# Patient Record
Sex: FEMALE | Race: BLACK OR AFRICAN AMERICAN | NOT HISPANIC OR LATINO | Employment: OTHER | URBAN - NONMETROPOLITAN AREA
[De-identification: names, ages, dates, MRNs, and addresses within clinical notes are randomized per-mention and may not be internally consistent; named-entity substitution may affect disease eponyms.]

---

## 2022-05-13 ENCOUNTER — OFFICE VISIT (OUTPATIENT)
Dept: FAMILY MEDICINE CLINIC | Facility: CLINIC | Age: 77
End: 2022-05-13

## 2022-05-13 VITALS
WEIGHT: 150 LBS | SYSTOLIC BLOOD PRESSURE: 114 MMHG | BODY MASS INDEX: 23.54 KG/M2 | OXYGEN SATURATION: 97 % | HEIGHT: 67 IN | DIASTOLIC BLOOD PRESSURE: 76 MMHG | HEART RATE: 88 BPM

## 2022-05-13 DIAGNOSIS — R09.89 POOR PERIPHERAL CIRCULATION: Primary | ICD-10-CM

## 2022-05-13 DIAGNOSIS — M17.12 PRIMARY OSTEOARTHRITIS OF LEFT KNEE: ICD-10-CM

## 2022-05-13 PROBLEM — E11.22 TYPE 2 DIABETES MELLITUS WITH DIABETIC CHRONIC KIDNEY DISEASE: Status: ACTIVE | Noted: 2022-04-12

## 2022-05-13 PROBLEM — I50.9 CONGESTIVE HEART FAILURE: Status: ACTIVE | Noted: 2022-04-12

## 2022-05-13 PROBLEM — Z85.3 PERSONAL HISTORY OF BREAST CANCER: Status: ACTIVE | Noted: 2022-04-12

## 2022-05-13 PROBLEM — N18.32 STAGE 3B CHRONIC KIDNEY DISEASE: Status: ACTIVE | Noted: 2022-04-12

## 2022-05-13 PROBLEM — F44.5 DISSOCIATIVE CONVULSIONS: Status: ACTIVE | Noted: 2020-01-16

## 2022-05-13 PROBLEM — I69.30 SEQUELA OF CEREBROVASCULAR ACCIDENT: Status: ACTIVE | Noted: 2020-01-16

## 2022-05-13 PROBLEM — N18.9 ANEMIA IN CHRONIC KIDNEY DISEASE: Status: ACTIVE | Noted: 2022-04-12

## 2022-05-13 PROBLEM — N25.81 SECONDARY HYPERPARATHYROIDISM OF RENAL ORIGIN: Status: ACTIVE | Noted: 2022-04-12

## 2022-05-13 PROBLEM — R60.9 EDEMA: Status: ACTIVE | Noted: 2022-04-12

## 2022-05-13 PROBLEM — R41.3 MEMORY IMPAIRMENT: Status: ACTIVE | Noted: 2019-07-11

## 2022-05-13 PROBLEM — G40.909 SEIZURE DISORDER: Status: ACTIVE | Noted: 2022-04-12

## 2022-05-13 PROBLEM — F32.A DEPRESSIVE DISORDER: Status: ACTIVE | Noted: 2022-04-12

## 2022-05-13 PROBLEM — D63.1 ANEMIA IN CHRONIC KIDNEY DISEASE: Status: ACTIVE | Noted: 2022-04-12

## 2022-05-13 PROBLEM — I12.9 UNSPECIFIED HYPERTENSIVE KIDNEY DISEASE WITH CHRONIC KIDNEY DISEASE STAGE I THROUGH STAGE IV, OR UNSPECIFIED: Status: ACTIVE | Noted: 2022-04-12

## 2022-05-13 PROBLEM — N25.89 RENAL TUBULAR ACIDOSIS: Status: ACTIVE | Noted: 2022-04-12

## 2022-05-13 PROBLEM — Z86.73 PERSONAL HISTORY OF TRANSIENT ISCHEMIC ATTACK (TIA), AND CEREBRAL INFARCTION WITHOUT RESIDUAL DEFICITS: Status: ACTIVE | Noted: 2022-04-12

## 2022-05-13 PROCEDURE — 99203 OFFICE O/P NEW LOW 30 MIN: CPT | Performed by: PHYSICIAN ASSISTANT

## 2022-05-13 RX ORDER — ROSUVASTATIN CALCIUM 20 MG/1
1 TABLET, COATED ORAL DAILY
COMMUNITY
Start: 2021-12-10 | End: 2022-06-17 | Stop reason: SDUPTHER

## 2022-05-13 RX ORDER — LETROZOLE 2.5 MG/1
1 TABLET, FILM COATED ORAL DAILY
COMMUNITY
End: 2022-07-28 | Stop reason: SDUPTHER

## 2022-05-13 RX ORDER — MULTIPLE VITAMINS W/ MINERALS TAB 9MG-400MCG
1 TAB ORAL DAILY
COMMUNITY

## 2022-05-13 RX ORDER — PHENYTOIN SODIUM 100 MG/1
100 CAPSULE, EXTENDED RELEASE ORAL
COMMUNITY
Start: 2021-12-10 | End: 2022-06-17 | Stop reason: SDUPTHER

## 2022-05-13 RX ORDER — OLMESARTAN MEDOXOMIL 40 MG/1
1 TABLET ORAL DAILY
COMMUNITY
Start: 2021-12-10 | End: 2022-06-17 | Stop reason: SDUPTHER

## 2022-05-13 RX ORDER — HYDRALAZINE HYDROCHLORIDE 50 MG/1
1 TABLET, FILM COATED ORAL 3 TIMES DAILY
COMMUNITY
Start: 2021-12-10 | End: 2022-06-17 | Stop reason: ALTCHOICE

## 2022-05-13 RX ORDER — SERTRALINE HYDROCHLORIDE 25 MG/1
TABLET, FILM COATED ORAL
COMMUNITY
Start: 2022-02-27 | End: 2022-05-13

## 2022-05-13 RX ORDER — LIDOCAINE 50 MG/G
1 PATCH TOPICAL EVERY 24 HOURS
Qty: 30 PATCH | Refills: 3 | Status: SHIPPED | OUTPATIENT
Start: 2022-05-13 | End: 2022-08-15

## 2022-05-13 RX ORDER — PAROXETINE 10 MG/1
1 TABLET, FILM COATED ORAL DAILY
COMMUNITY
End: 2022-06-17 | Stop reason: ALTCHOICE

## 2022-05-13 RX ORDER — FUROSEMIDE 20 MG/1
TABLET ORAL
COMMUNITY
Start: 2022-02-27 | End: 2022-06-17 | Stop reason: SDUPTHER

## 2022-05-13 RX ORDER — TAMSULOSIN HYDROCHLORIDE 0.4 MG/1
CAPSULE ORAL
COMMUNITY
Start: 2022-02-27 | End: 2022-06-17 | Stop reason: SDUPTHER

## 2022-05-13 NOTE — ASSESSMENT & PLAN NOTE
I explained that yes she may have some poor circulation but since she is not having any issues with it, and given her other health issues,  I do not recommend any to be done about it at this time. Continue to monitor for evidence of skin break down or other complications.

## 2022-05-13 NOTE — ASSESSMENT & PLAN NOTE
She is complaining of knee pain today, she is not a candidate for NSAIDS, she takes tylenol as needed but would like to try a pain patch.  I sent RX for lididerm hopefully it will give her some relieff

## 2022-05-13 NOTE — PROGRESS NOTES
"Chief Complaint  Circulatory Problem (The [facility] nurse came and told her she had circulation problems. )    Subjective          Sirena Chong presents to [facility]  History of Present Illness patient states she is here today because the [facility] nurse told her she had \"poor circulation\" in her legs and they told her that she needed to get something done for this.  She is not having any symptoms she has chronic knee pain in her calf.  She has no numbness tingling or neuropathic pain in her feet and legs.  She does not have open sores or skin changes to her lower extremities.    Objective     Vital Signs:   /76 (BP Location: Right arm, Patient Position: Sitting, Cuff Size: Adult)   Pulse 88   Ht 170.2 cm (67\")   Wt 68 kg (150 lb)   SpO2 97%   BMI 23.49 kg/m²     Body mass index is 23.49 kg/m².    Review of Systems   Constitutional: Negative.    Respiratory: Negative.    Cardiovascular: Negative.    Musculoskeletal: Positive for arthralgias.   Psychiatric/Behavioral: Negative.        Social History: reports that she quit smoking about 6 years ago. Her smoking use included cigarettes. She has a 25.00 pack-year smoking history. She has never used smokeless tobacco. She reports previous alcohol use. She reports that she does not use drugs.      Current Outpatient Medications:   •  hydrALAZINE (APRESOLINE) 50 MG tablet, Take 1 tablet by mouth 3 (Three) Times a Day., Disp: , Rfl:   •  olmesartan (BENICAR) 40 MG tablet, Take 1 tablet by mouth Daily., Disp: , Rfl:   •  phenytoin ER (DILANTIN) 100 MG capsule, Take 100 mg by mouth., Disp: , Rfl:   •  rosuvastatin (CRESTOR) 20 MG tablet, Take 1 tablet by mouth Daily., Disp: , Rfl:   •  Aspirin Buf,CaCarb-MgCarb-MgO, 81 MG tablet, Take 1 tablet by mouth Daily., Disp: , Rfl:   •  furosemide (LASIX) 20 MG tablet, , Disp: , Rfl:   •  letrozole (FEMARA) 2.5 MG tablet, Take 1 tablet by mouth Daily., Disp: , Rfl:   •  lidocaine (LIDODERM) " 5 %, Place 1 patch on the skin as directed by provider Daily. Remove & Discard patch within 12 hours or as directed by MD, Disp: 30 patch, Rfl: 3  •  multivitamin with minerals (Centrum Adults) tablet tablet, Take 1 tablet by mouth Daily., Disp: , Rfl:   •  PARoxetine (PAXIL) 10 MG tablet, Take 1 tablet by mouth Daily., Disp: , Rfl:   •  tamsulosin (FLOMAX) 0.4 MG capsule 24 hr capsule, , Disp: , Rfl:     Allergies: Patient has no known allergies.    Physical Exam  Constitutional:       Appearance: She is ill-appearing.   Cardiovascular:      Rate and Rhythm: Normal rate and regular rhythm.      Pulses:           Dorsalis pedis pulses are 1+ on the right side and 1+ on the left side.        Posterior tibial pulses are 0 on the right side and 0 on the left side.   Pulmonary:      Effort: Pulmonary effort is normal.      Breath sounds: Normal breath sounds.   Musculoskeletal:      Right foot: No deformity, bunion, Charcot foot or foot drop.      Left foot: No deformity, bunion, Charcot foot or foot drop.   Feet:      Right foot:      Protective Sensation: 5 sites tested. 3 sites sensed.      Skin integrity: Dry skin present. No ulcer, blister, skin breakdown, erythema, warmth, callus or fissure.      Left foot:      Protective Sensation: 5 sites tested. 3 sites sensed.      Skin integrity: Dry skin present. No ulcer, blister, skin breakdown, erythema, warmth, callus or fissure.   Skin:     General: Skin is warm and dry.      Capillary Refill: Capillary refill takes less than 2 seconds.      Coloration: Skin is not jaundiced or pale.      Findings: No bruising, erythema, lesion or rash.   Neurological:      Mental Status: She is alert. Mental status is at baseline.             Assessment and Plan   Diagnoses and all orders for this visit:    1. Poor peripheral circulation (Primary)  Assessment & Plan:  I explained that yes she may have some poor circulation but since she is not having any issues with it, and given her  other health issues,  I do not recommend any to be done about it at this time. Continue to monitor for evidence of skin break down or other complications.        2. Primary osteoarthritis of left knee  Assessment & Plan:  She is complaining of knee pain today, she is not a candidate for NSAIDS, she takes tylenol as needed but would like to try a pain patch.  I sent RX for lididerm hopefully it will give her some relieff      Other orders  -     lidocaine (LIDODERM) 5 %; Place 1 patch on the skin as directed by provider Daily. Remove & Discard patch within 12 hours or as directed by MD  Dispense: 30 patch; Refill: 3          Follow Up   Return in about 3 months (around 8/13/2022) for Recheck.  Patient was given instructions and counseling regarding her condition or for health maintenance advice. Please see specific information pulled into the AVS if appropriate.     Miriam Ashton PA-C

## 2022-05-17 ENCOUNTER — TELEPHONE (OUTPATIENT)
Dept: FAMILY MEDICINE CLINIC | Facility: CLINIC | Age: 77
End: 2022-05-17

## 2022-06-16 ENCOUNTER — TELEPHONE (OUTPATIENT)
Dept: FAMILY MEDICINE CLINIC | Facility: CLINIC | Age: 77
End: 2022-06-16

## 2022-06-16 NOTE — TELEPHONE ENCOUNTER
Called and talked to Fatoumata), it was explained to him that the only medication that was discussed on the 05/13 visit was the Lidocaine patches, no other medication. Explained that it is time for her to have a med recheck to get refills for the other maintenance drugs. We have her an appt for 06/17 at 1:15pm with Sarah.    Daniel was told to bring her med bottles so that we can get all of her meds taken care of.   Also addressed the issue of finding another provider, he stated that he was not sure if she had seen another provider here.

## 2022-06-16 NOTE — TELEPHONE ENCOUNTER
TOMI OLEA HER POA CALLED AND SAID SHE WILL OUT OF HER MEDS TOMORROW. HE DID NOT KNOW WHAT THEY ARE. I ASKED HIM TO CALL ME BACK WITH THEM BUT HE SAID THE PHARMACY SENT SOMETHING 3 WEEKS AND HE WAS AT WORK AND DIDN'T HAVE THEM WITH HIM. SHE IS SCHEDULED FOR A MED CHECK ON 8-22. TOMI WAS UPSET SO CAN YOU LOOK INTO THIS PLEASE. HE CAN BE REACHED - [phone]

## 2022-06-16 NOTE — TELEPHONE ENCOUNTER
Called and talked to Fatoumata), it was explained to him that the only medication that was discussed on the 05/13 visit was the Lidocaine patches, no other medication. Explained that it is time for her to have a med recheck to get refills for the other maintenance drugs. We have her an appt for 06/17 at 1:15pm with Sarah.

## 2022-06-17 ENCOUNTER — OFFICE VISIT (OUTPATIENT)
Dept: FAMILY MEDICINE CLINIC | Facility: CLINIC | Age: 77
End: 2022-06-17

## 2022-06-17 VITALS
DIASTOLIC BLOOD PRESSURE: 60 MMHG | WEIGHT: 150 LBS | BODY MASS INDEX: 23.54 KG/M2 | SYSTOLIC BLOOD PRESSURE: 124 MMHG | HEIGHT: 67 IN

## 2022-06-17 DIAGNOSIS — D50.8 OTHER IRON DEFICIENCY ANEMIA: ICD-10-CM

## 2022-06-17 DIAGNOSIS — G40.909 SEIZURE DISORDER: Primary | ICD-10-CM

## 2022-06-17 DIAGNOSIS — F32.A DEPRESSIVE DISORDER: ICD-10-CM

## 2022-06-17 DIAGNOSIS — R73.9 HYPERGLYCEMIA: ICD-10-CM

## 2022-06-17 DIAGNOSIS — N18.32 STAGE 3B CHRONIC KIDNEY DISEASE: ICD-10-CM

## 2022-06-17 DIAGNOSIS — R33.9 URINE RETENTION: ICD-10-CM

## 2022-06-17 DIAGNOSIS — Z85.3 PERSONAL HISTORY OF BREAST CANCER: ICD-10-CM

## 2022-06-17 DIAGNOSIS — E78.2 MIXED HYPERLIPIDEMIA: ICD-10-CM

## 2022-06-17 PROBLEM — E11.65 TYPE 2 DIABETES MELLITUS WITH HYPERGLYCEMIA: Status: ACTIVE | Noted: 2022-06-17

## 2022-06-17 PROBLEM — D50.9 IRON DEFICIENCY ANEMIA: Status: ACTIVE | Noted: 2022-06-17

## 2022-06-17 PROCEDURE — 99214 OFFICE O/P EST MOD 30 MIN: CPT | Performed by: PHYSICIAN ASSISTANT

## 2022-06-17 RX ORDER — FUROSEMIDE 20 MG/1
TABLET ORAL
Qty: 45 TABLET | Refills: 1 | Status: SHIPPED | OUTPATIENT
Start: 2022-06-17 | End: 2022-06-17 | Stop reason: SDUPTHER

## 2022-06-17 RX ORDER — ROSUVASTATIN CALCIUM 20 MG/1
TABLET, COATED ORAL
Qty: 90 TABLET | Refills: 1 | Status: SHIPPED | OUTPATIENT
Start: 2022-06-17 | End: 2022-07-27 | Stop reason: SDUPTHER

## 2022-06-17 RX ORDER — SODIUM BICARBONATE 650 MG/1
650 TABLET ORAL 2 TIMES DAILY
COMMUNITY
End: 2022-06-17 | Stop reason: SDUPTHER

## 2022-06-17 RX ORDER — TAMSULOSIN HYDROCHLORIDE 0.4 MG/1
CAPSULE ORAL
Qty: 90 CAPSULE | Refills: 1 | Status: SHIPPED | OUTPATIENT
Start: 2022-06-17 | End: 2022-07-27 | Stop reason: SDUPTHER

## 2022-06-17 RX ORDER — FUROSEMIDE 20 MG/1
TABLET ORAL
Qty: 15 TABLET | Refills: 0 | Status: SHIPPED | OUTPATIENT
Start: 2022-06-17 | End: 2022-07-27 | Stop reason: SDUPTHER

## 2022-06-17 RX ORDER — PHENYTOIN SODIUM 100 MG/1
CAPSULE, EXTENDED RELEASE ORAL
Qty: 270 CAPSULE | Refills: 1 | Status: SHIPPED | OUTPATIENT
Start: 2022-06-17 | End: 2022-07-27 | Stop reason: SDUPTHER

## 2022-06-17 RX ORDER — SODIUM BICARBONATE 650 MG/1
650 TABLET ORAL 2 TIMES DAILY
Qty: 60 TABLET | Refills: 0 | Status: SHIPPED | OUTPATIENT
Start: 2022-06-17 | End: 2022-08-15

## 2022-06-17 RX ORDER — OLMESARTAN MEDOXOMIL 40 MG/1
TABLET ORAL
Qty: 90 TABLET | Refills: 1 | Status: SHIPPED | OUTPATIENT
Start: 2022-06-17 | End: 2022-06-17 | Stop reason: SDUPTHER

## 2022-06-17 RX ORDER — OLMESARTAN MEDOXOMIL 40 MG/1
TABLET ORAL
Qty: 30 TABLET | Refills: 0 | Status: SHIPPED | OUTPATIENT
Start: 2022-06-17 | End: 2022-07-27 | Stop reason: SDUPTHER

## 2022-06-17 RX ORDER — SERTRALINE HYDROCHLORIDE 25 MG/1
TABLET, FILM COATED ORAL
Qty: 30 TABLET | Refills: 0 | Status: SHIPPED | OUTPATIENT
Start: 2022-06-17 | End: 2022-07-27 | Stop reason: SDUPTHER

## 2022-06-17 RX ORDER — SERTRALINE HYDROCHLORIDE 25 MG/1
TABLET, FILM COATED ORAL
Qty: 90 TABLET | Refills: 1 | Status: SHIPPED | OUTPATIENT
Start: 2022-06-17 | End: 2022-06-17 | Stop reason: SDUPTHER

## 2022-06-17 RX ORDER — SERTRALINE HYDROCHLORIDE 25 MG/1
25 TABLET, FILM COATED ORAL DAILY
COMMUNITY
End: 2022-06-17 | Stop reason: SDUPTHER

## 2022-06-17 RX ORDER — SODIUM BICARBONATE 650 MG/1
650 TABLET ORAL 2 TIMES DAILY
Qty: 180 TABLET | Refills: 1 | Status: SHIPPED | OUTPATIENT
Start: 2022-06-17 | End: 2022-06-17 | Stop reason: SDUPTHER

## 2022-06-21 NOTE — PROGRESS NOTES
"Chief Complaint  Hypertension (Patient is needing medication refilled), Hyperlipidemia (Patient is needing medication refilled), and Anxiety (Patient is needing medication refilled)    Subjective        Sirena Chong presents to [facility]  Patient reports today with her son secondary to needing medication refills.  Patient son reports that she developed COVID earlier this year and was discharged from by physical refill rehab around a month ago.  Patient states she has been feeling well reports taking her medications daily.    Patient has a history of seizure disorder and states her last seizure was years ago.  Reports taking phenytoin daily.    Patient also has a history of hypertension high cholesterol states she takes her medication daily.    Patient reports having chronic kidney failure however states her last kidney function was normal discharge.    Patient reports having anxiety states sertraline works well however she thought she was taking Paxil.          Hypertension  Associated symptoms include anxiety.   Hyperlipidemia    Anxiety            Objective   Vital Signs:  /60 (BP Location: Left arm, Patient Position: Sitting, Cuff Size: Adult)   Ht 170.2 cm (67\")   Wt 68 kg (150 lb)   BMI 23.49 kg/m²   Estimated body mass index is 23.49 kg/m² as calculated from the following:    Height as of this encounter: 170.2 cm (67\").    Weight as of this encounter: 68 kg (150 lb).    BMI is within normal parameters. No other follow-up for BMI required.      Physical Exam  Vitals and nursing note reviewed.   Constitutional:       General: She is not in acute distress.     Appearance: She is ill-appearing.   HENT:      Head: Normocephalic.      Right Ear: Hearing normal.      Left Ear: Hearing normal.   Eyes:      Pupils: Pupils are equal, round, and reactive to light.   Cardiovascular:      Rate and Rhythm: Normal rate and regular rhythm.   Pulmonary:      Effort: Pulmonary effort is " normal. No respiratory distress.   Skin:     General: Skin is warm and dry.   Neurological:      Mental Status: She is alert.   Psychiatric:         Mood and Affect: Mood normal.        Result Review :                Assessment and Plan   Diagnoses and all orders for this visit:    1. Seizure disorder (HCC) (Primary)  Assessment & Plan:  Blood work today we will have patient continue follow-up with neurology      2. Other iron deficiency anemia  Assessment & Plan:  Continue current treatment plan with over-the-counter meds    Orders:  -     Iron; Future  -     Ferritin; Future  -     Iron  -     Ferritin    3. Depressive disorder  Assessment & Plan:  Patient's depression is recurrent and currently active patient prescribed sertraline refills.      4. Mixed hyperlipidemia  Assessment & Plan:  Refill patient's medication today      5. Stage 3b chronic kidney disease (HCC)  Assessment & Plan:  We will check blood work    Orders:  -     CBC & Differential; Future  -     Comprehensive Metabolic Panel; Future  -     CBC & Differential  -     Comprehensive Metabolic Panel    6. Personal history of breast cancer  Assessment & Plan:  Patient will continue follow-up with oncology      7. Urine retention  Assessment & Plan:  Refill patient's tamsulosin      8. Hyperglycemia  Assessment & Plan:  We will check A1c today patient has history of diabetes however states she has not had any medication for a while    Orders:  -     Hemoglobin A1c; Future  -     Hemoglobin A1c    Other orders  -     tamsulosin (FLOMAX) 0.4 MG capsule 24 hr capsule; Take 1 tab po daily to help empty urine  Dispense: 90 capsule; Refill: 1  -     Discontinue: sodium bicarbonate 650 MG tablet; Take 1 tablet by mouth 2 (Two) Times a Day.  Dispense: 180 tablet; Refill: 1  -     Discontinue: sertraline (ZOLOFT) 25 MG tablet; Take 1 tab po daily for mood  Dispense: 90 tablet; Refill: 1  -     rosuvastatin (CRESTOR) 20 MG tablet; Take 1 tab po daily for  cholesterol  Dispense: 90 tablet; Refill: 1  -     phenytoin ER (DILANTIN) 100 MG capsule; Take 1 tab po TID for seizure prevention  Dispense: 270 capsule; Refill: 1  -     Discontinue: olmesartan (BENICAR) 40 MG tablet; Take 1 tab po daily for blood pressure  Dispense: 90 tablet; Refill: 1  -     Discontinue: furosemide (LASIX) 20 MG tablet; Take 1/2 tab po daily for blood pressure  Dispense: 45 tablet; Refill: 1  -     Aspirin Buf,CaCarb-MgCarb-MgO, 81 MG tablet; Take 1 tablet by mouth Daily.  Dispense: 90 tablet; Refill: 3  -     sodium bicarbonate 650 MG tablet; Take 1 tablet by mouth 2 (Two) Times a Day.  Dispense: 60 tablet; Refill: 0  -     sertraline (ZOLOFT) 25 MG tablet; Take 1 tab po daily for mood  Dispense: 30 tablet; Refill: 0  -     olmesartan (BENICAR) 40 MG tablet; Take 1 tab po daily for blood pressure  Dispense: 30 tablet; Refill: 0  -     furosemide (LASIX) 20 MG tablet; Take 1/2 tab po daily for blood pressure  Dispense: 15 tablet; Refill: 0           Follow Up   No follow-ups on file.  Patient was given instructions and counseling regarding her condition or for health maintenance advice. Please see specific information pulled into the AVS if appropriate.

## 2022-06-21 NOTE — ASSESSMENT & PLAN NOTE
We will check A1c today patient has history of diabetes however states she has not had any medication for a while

## 2022-06-28 ENCOUNTER — TELEPHONE (OUTPATIENT)
Dept: FAMILY MEDICINE CLINIC | Facility: CLINIC | Age: 77
End: 2022-06-28

## 2022-06-28 NOTE — TELEPHONE ENCOUNTER
Pts son contacted. Advised son to take patient to ER for further evaluation giving patient had back to back seizures.

## 2022-06-28 NOTE — TELEPHONE ENCOUNTER
PATIENTS SON, TOMI IS CALLING STATING THAT THE PATIENT HAD 2 SEIZURES YESTERDAY AND IS NOW HAVING BACK PAIN.     PLEASE RETURN HIS CALL.

## 2022-07-27 RX ORDER — LETROZOLE 2.5 MG/1
2.5 TABLET, FILM COATED ORAL DAILY
Status: CANCELLED | OUTPATIENT
Start: 2022-07-27

## 2022-07-27 RX ORDER — TAMSULOSIN HYDROCHLORIDE 0.4 MG/1
CAPSULE ORAL
Qty: 90 CAPSULE | Refills: 1 | Status: SHIPPED | OUTPATIENT
Start: 2022-07-27 | End: 2022-07-28 | Stop reason: SDUPTHER

## 2022-07-27 RX ORDER — PHENYTOIN SODIUM 100 MG/1
CAPSULE, EXTENDED RELEASE ORAL
Qty: 270 CAPSULE | Refills: 1 | Status: SHIPPED | OUTPATIENT
Start: 2022-07-27 | End: 2022-07-28 | Stop reason: SDUPTHER

## 2022-07-27 RX ORDER — OLMESARTAN MEDOXOMIL 40 MG/1
TABLET ORAL
Qty: 30 TABLET | Refills: 0 | Status: SHIPPED | OUTPATIENT
Start: 2022-07-27 | End: 2022-07-28 | Stop reason: SDUPTHER

## 2022-07-27 RX ORDER — FUROSEMIDE 20 MG/1
TABLET ORAL
Qty: 15 TABLET | Refills: 0 | Status: SHIPPED | OUTPATIENT
Start: 2022-07-27 | End: 2022-07-28 | Stop reason: SDUPTHER

## 2022-07-27 RX ORDER — ROSUVASTATIN CALCIUM 20 MG/1
TABLET, COATED ORAL
Qty: 90 TABLET | Refills: 1 | Status: SHIPPED | OUTPATIENT
Start: 2022-07-27 | End: 2022-07-28 | Stop reason: SDUPTHER

## 2022-07-27 RX ORDER — SERTRALINE HYDROCHLORIDE 25 MG/1
TABLET, FILM COATED ORAL
Qty: 30 TABLET | Refills: 0 | Status: SHIPPED | OUTPATIENT
Start: 2022-07-27 | End: 2022-07-28 | Stop reason: SDUPTHER

## 2022-07-27 NOTE — TELEPHONE ENCOUNTER
Caller: TOMI OLEA    Relationship: Emergency Contact    Best call back number: [phone]    Requested Prescriptions:   Requested Prescriptions     Pending Prescriptions Disp Refills   • phenytoin ER (DILANTIN) 100 MG capsule 270 capsule 1     Sig: Take 1 tab po TID for seizure prevention   • sertraline (ZOLOFT) 25 MG tablet 30 tablet 0     Sig: Take 1 tab po daily for mood   • furosemide (LASIX) 20 MG tablet 15 tablet 0     Sig: Take 1/2 tab po daily for blood pressure   • olmesartan (BENICAR) 40 MG tablet 30 tablet 0     Sig: Take 1 tab po daily for blood pressure   • tamsulosin (FLOMAX) 0.4 MG capsule 24 hr capsule 90 capsule 1     Sig: Take 1 tab po daily to help empty urine   • rosuvastatin (CRESTOR) 20 MG tablet 90 tablet 1     Sig: Take 1 tab po daily for cholesterol   • letrozole (FEMARA) 2.5 MG tablet       Sig: Take 1 tablet by mouth Daily.        Pharmacy where request should be sent:    [facility] [address] [phone] [facility] [phone] [facility]        Additional details provided by patient:   Does the patient have less than a 3 day supply:  [x] Yes  [] No    Cori Emmanuel   07/27/22 12:07 EDT

## 2022-07-28 RX ORDER — SERTRALINE HYDROCHLORIDE 25 MG/1
TABLET, FILM COATED ORAL
Qty: 30 TABLET | Refills: 0 | Status: SHIPPED | OUTPATIENT
Start: 2022-07-28 | End: 2022-08-15 | Stop reason: SDUPTHER

## 2022-07-28 RX ORDER — FUROSEMIDE 20 MG/1
TABLET ORAL
Qty: 15 TABLET | Refills: 0 | Status: SHIPPED | OUTPATIENT
Start: 2022-07-28 | End: 2022-08-15 | Stop reason: SDUPTHER

## 2022-07-28 RX ORDER — LETROZOLE 2.5 MG/1
2.5 TABLET, FILM COATED ORAL DAILY
Qty: 90 TABLET | Refills: 0 | Status: SHIPPED | OUTPATIENT
Start: 2022-07-28 | End: 2022-08-15 | Stop reason: SDUPTHER

## 2022-07-28 RX ORDER — PHENYTOIN SODIUM 100 MG/1
CAPSULE, EXTENDED RELEASE ORAL
Qty: 270 CAPSULE | Refills: 1 | Status: SHIPPED | OUTPATIENT
Start: 2022-07-28 | End: 2022-08-15 | Stop reason: SDUPTHER

## 2022-07-28 RX ORDER — ROSUVASTATIN CALCIUM 20 MG/1
TABLET, COATED ORAL
Qty: 90 TABLET | Refills: 1 | Status: SHIPPED | OUTPATIENT
Start: 2022-07-28 | End: 2022-08-15 | Stop reason: SDUPTHER

## 2022-07-28 RX ORDER — OLMESARTAN MEDOXOMIL 40 MG/1
TABLET ORAL
Qty: 30 TABLET | Refills: 0 | Status: SHIPPED | OUTPATIENT
Start: 2022-07-28 | End: 2022-08-15 | Stop reason: SDUPTHER

## 2022-07-28 RX ORDER — TAMSULOSIN HYDROCHLORIDE 0.4 MG/1
CAPSULE ORAL
Qty: 90 CAPSULE | Refills: 1 | Status: SHIPPED | OUTPATIENT
Start: 2022-07-28 | End: 2022-08-15

## 2022-08-15 ENCOUNTER — OFFICE VISIT (OUTPATIENT)
Dept: FAMILY MEDICINE CLINIC | Facility: CLINIC | Age: 77
End: 2022-08-15

## 2022-08-15 VITALS — DIASTOLIC BLOOD PRESSURE: 80 MMHG | SYSTOLIC BLOOD PRESSURE: 128 MMHG | HEART RATE: 67 BPM | OXYGEN SATURATION: 97 %

## 2022-08-15 DIAGNOSIS — Z86.73 HISTORY OF STROKE: ICD-10-CM

## 2022-08-15 DIAGNOSIS — Z00.00 GENERAL MEDICAL EXAM: Primary | ICD-10-CM

## 2022-08-15 DIAGNOSIS — F41.8 ANXIETY ASSOCIATED WITH DEPRESSION: ICD-10-CM

## 2022-08-15 DIAGNOSIS — Z85.3 HISTORY OF LEFT BREAST CANCER: ICD-10-CM

## 2022-08-15 DIAGNOSIS — I10 HYPERTENSION, ESSENTIAL: ICD-10-CM

## 2022-08-15 DIAGNOSIS — I69.398 SEIZURE DISORDER AS SEQUELA OF CEREBROVASCULAR ACCIDENT: ICD-10-CM

## 2022-08-15 DIAGNOSIS — E78.2 HYPERLIPIDEMIA, MIXED: ICD-10-CM

## 2022-08-15 DIAGNOSIS — G40.909 SEIZURE DISORDER AS SEQUELA OF CEREBROVASCULAR ACCIDENT: ICD-10-CM

## 2022-08-15 PROBLEM — E11.22 TYPE 2 DIABETES MELLITUS WITH DIABETIC CHRONIC KIDNEY DISEASE: Status: RESOLVED | Noted: 2022-04-12 | Resolved: 2022-08-15

## 2022-08-15 PROCEDURE — G0439 PPPS, SUBSEQ VISIT: HCPCS | Performed by: FAMILY MEDICINE

## 2022-08-15 PROCEDURE — 1159F MED LIST DOCD IN RCRD: CPT | Performed by: FAMILY MEDICINE

## 2022-08-15 PROCEDURE — 1170F FXNL STATUS ASSESSED: CPT | Performed by: FAMILY MEDICINE

## 2022-08-15 PROCEDURE — 99397 PER PM REEVAL EST PAT 65+ YR: CPT | Performed by: FAMILY MEDICINE

## 2022-08-15 RX ORDER — ROSUVASTATIN CALCIUM 20 MG/1
TABLET, COATED ORAL
Qty: 90 TABLET | Refills: 1 | Status: SHIPPED | OUTPATIENT
Start: 2022-08-15 | End: 2023-03-20 | Stop reason: SDUPTHER

## 2022-08-15 RX ORDER — OLMESARTAN MEDOXOMIL 40 MG/1
TABLET ORAL
Qty: 90 TABLET | Refills: 1 | Status: SHIPPED | OUTPATIENT
Start: 2022-08-15 | End: 2023-03-20 | Stop reason: SDUPTHER

## 2022-08-15 RX ORDER — LETROZOLE 2.5 MG/1
2.5 TABLET, FILM COATED ORAL DAILY
Refills: 0
Start: 2022-08-15

## 2022-08-15 RX ORDER — PHENYTOIN SODIUM 100 MG/1
CAPSULE, EXTENDED RELEASE ORAL
Qty: 270 CAPSULE | Refills: 1 | Status: SHIPPED | OUTPATIENT
Start: 2022-08-15 | End: 2023-03-20 | Stop reason: SDUPTHER

## 2022-08-15 RX ORDER — ASPIRIN 81 MG/1
81 TABLET ORAL DAILY
Start: 2022-08-15 | End: 2023-03-20 | Stop reason: SDUPTHER

## 2022-08-15 RX ORDER — FUROSEMIDE 20 MG/1
TABLET ORAL
Qty: 45 TABLET | Refills: 1 | Status: SHIPPED | OUTPATIENT
Start: 2022-08-15 | End: 2023-03-20 | Stop reason: SDUPTHER

## 2022-08-15 NOTE — PROGRESS NOTES
QUICK REFERENCE INFORMATION:  The ABCs of the Annual Wellness Visit    Subsequent Medicare Wellness Visit    @[email or web address]@    HEALTH RISK ASSESSMENT    1945    Recent Hospitalizations:  Recently treated at the following:  Other: [facility] [place] - May 2022. COVID19 infection.  Hospitalized and rehab after discharge .        Current Medical Providers:  Patient Care Team:  Patrick Cotto MD as PCP - General (Family Medicine)        Smoking Status:  Social History     Tobacco Use   Smoking Status Former Smoker   • Packs/day: 1.00   • Years: 25.00   • Pack years: 25.00   • Types: Cigarettes   • Quit date:    • Years since quittin.6   Smokeless Tobacco Never Used       Alcohol Consumption:  Social History     Substance and Sexual Activity   Alcohol Use Not Currently       Depression Screen:   PHQ-2/PHQ-9 Depression Screening 2022   Little Interest or Pleasure in Doing Things 0-->not at all   Feeling Down, Depressed or Hopeless 0-->not at all   PHQ-9: Brief Depression Severity Measure Score 0       Health Habits and Functional and Cognitive Screening:  Functional & Cognitive Status 8/15/2022   Do you have difficulty preparing food and eating? No   Do you have difficulty bathing yourself, getting dressed or grooming yourself? No   Do you have difficulty using the toilet? No   Do you have difficulty moving around from place to place? No   Do you have trouble with steps or getting out of a bed or a chair? No   Current Diet Well Balanced Diet   Dental Exam Not up to date   Eye Exam Not up to date   Exercise (times per week) 0 times per week   Current Exercises Include No Regular Exercise   Do you need help using the phone?  No   Are you deaf or do you have serious difficulty hearing?  No   Do you need help with transportation? No   Do you need help shopping? No   Do you need help preparing meals?  No   Do you need help with housework?  No   Do you need help with laundry? No   Do you need help taking your  medications? No   Do you need help managing money? No   Do you ever drive or ride in a car without wearing a seat belt? No   Have you felt unusual stress, anger or loneliness in the last month? No   Who do you live with? Spouse   If you need help, do you have trouble finding someone available to you? No   Have you been bothered in the last four weeks by sexual problems? No   Do you have difficulty concentrating, remembering or making decisions? No       Fall Risk Screen:  STEADI Fall Risk Assessment was completed, and patient is at HIGH risk for falls. Assessment completed on:5/13/2022    ACE III MINI        Does the patient have evidence of cognitive impairment? No    Aspirin use counseling: Taking ASA appropriately as indicated    Recent Lab Results:  CMP:     HbA1c:  No results found for: HGBA1C  Microalbumin:  No results found for: MICROALBUR, POCMALB, POCCREAT  Lipid Panel  No results found for: CHOL, TRIG, HDL, LDL, AST, ALT    Visual Acuity:  No exam data present    Age-appropriate Screening Schedule:  Refer to the list below for future screening recommendations based on patient's age, sex and/or medical conditions. Orders for these recommended tests are listed in the plan section. The patient has been provided with a written plan.    Health Maintenance   Topic Date Due   • DXA SCAN  Never done   • TDAP/TD VACCINES (1 - Tdap) Never done   • HEMOGLOBIN A1C  06/10/2022   • LIPID PANEL  Never done   • INFLUENZA VACCINE  10/01/2022   • DIABETIC EYE EXAM  Discontinued   • URINE MICROALBUMIN  Discontinued   • ZOSTER VACCINE  Discontinued        Subjective   History of Present Illness    Sirena Chong is a 77 y.o. female who presents for a Subsequent Wellness Visit.    Here with her son for annual wellness visit and physical.  Will return to get fasting blood work up-to-date.    Unfortunately she did have COVID since our last visit and was in the hospital followed by rehab facility.  She is recovered well but has  had some breakthrough seizures after her COVID infection.  She continues on Dilantin with no new medications after her admission for COVID.    She continues to decline vaccination for COVID.    History of diabetes listed on her chart but her last A1c was under under 5.7 on no medications.    Discussed with her son DEXA and low-dose lung CT which are past due and they want to hold off at this time.    Continues with oncology follow-up on 5    Following left breast cancer.    CHRONIC CONDITIONS    The following portions of the patient's history were reviewed and updated as appropriate: allergies, current medications, past family history, past medical history, past social history, past surgical history and problem list.    Outpatient Medications Prior to Visit   Medication Sig Dispense Refill   • multivitamin with minerals tablet tablet Take 1 tablet by mouth Daily.     • Aspirin Buf,CaCarb-MgCarb-MgO, 81 MG tablet Take 1 tablet by mouth Daily. 90 tablet 3   • furosemide (LASIX) 20 MG tablet Take 1/2 tab po daily for blood pressure 15 tablet 0   • letrozole (FEMARA) 2.5 MG tablet Take 1 tablet by mouth Daily. 90 tablet 0   • lidocaine (LIDODERM) 5 % Place 1 patch on the skin as directed by provider Daily. Remove & Discard patch within 12 hours or as directed by MD 30 patch 3   • olmesartan (BENICAR) 40 MG tablet Take 1 tab po daily for blood pressure 30 tablet 0   • phenytoin ER (DILANTIN) 100 MG capsule Take 1 tab po TID for seizure prevention 270 capsule 1   • rosuvastatin (CRESTOR) 20 MG tablet Take 1 tab po daily for cholesterol 90 tablet 1   • sertraline (ZOLOFT) 25 MG tablet Take 1 tab po daily for mood 30 tablet 0   • sodium bicarbonate 650 MG tablet Take 1 tablet by mouth 2 (Two) Times a Day. 60 tablet 0   • tamsulosin (FLOMAX) 0.4 MG capsule 24 hr capsule Take 1 tab po daily to help empty urine 90 capsule 1     No facility-administered medications prior to visit.       Patient Active Problem List   Diagnosis    • Renal tubular acidosis   • Unspecified hypertensive kidney disease with chronic kidney disease stage I through stage IV, or unspecified   • Stage 3b chronic kidney disease (HCC)   • Sequela of cerebrovascular accident   • Secondary hyperparathyroidism of renal origin (HCC)   • Personal history of transient ischemic attack (TIA), and cerebral infarction without residual deficits   • Personal history of breast cancer   • Memory impairment   • Edema   • Seizure disorder (HCC)   • Depressive disorder   • Congestive heart failure (HCC)   • Anemia in chronic kidney disease   • Dissociative convulsions   • Poor peripheral circulation   • Primary osteoarthritis of left knee   • Iron deficiency anemia   • Mixed hyperlipidemia   • Urine retention   • Type 2 diabetes mellitus with hyperglycemia (HCC)   • Hyperglycemia   • General medical exam   • Hypertension, essential   • History of left breast cancer   • Hyperlipidemia, mixed   • Anxiety associated with depression   • Seizure disorder as sequela of cerebrovascular accident (HCC)       Advance Care Planning:  ACP discussion was held with the patient during this visit. Patient has an advance directive (not in EMR), copy requested.    Identification of Risk Factors:  Risk factors include: Advance Directive Discussion.    Review of Systems   Constitutional: Negative for appetite change, chills, fever and unexpected weight change.   HENT: Negative for hearing loss.    Eyes: Negative for visual disturbance.   Respiratory: Negative for chest tightness, shortness of breath and wheezing.    Cardiovascular: Negative for chest pain, palpitations and leg swelling.   Gastrointestinal: Negative for abdominal pain.   Musculoskeletal: Negative for arthralgias, back pain and gait problem.   Skin: Negative for rash.   Neurological: Positive for seizures and weakness. Negative for dizziness and headaches.        See HPI   Psychiatric/Behavioral: Negative for agitation and confusion. The  patient is nervous/anxious.        Compared to one year ago, the patient feels her physical health is better.  Compared to one year ago, the patient feels her mental health is better.    Objective     Physical Exam     Procedures     Vitals:    08/15/22 1437   BP: 128/80   BP Location: Right arm   Patient Position: Sitting   Cuff Size: Adult   Pulse: 67   SpO2: 97%       BMI is within normal parameters. No other follow-up for BMI required.      No results found for: WBC, HGB, HCT, MCV, PLT  No results found for: GLUCOSE, BUN, CREATININE, EGFRIFNONA, EGFRIFAFRI, BCR, K, CO2, CALCIUM, PROTENTOTREF, ALBUMIN, LABIL2, BILIRUBIN, AST, ALT  No results found for: TSH  No results found for: PSA  No results found for: CHOL, CHLPL, TRIG, HDL, LDL, LDLDIRECT    Assessment & Plan   Problem List Items Addressed This Visit        Cardiac and Vasculature    Hypertension, essential    Current Assessment & Plan     Hypertension is improving with treatment.  Continue current treatment regimen.  Blood pressure will be reassessed at the next regular appointment.         Relevant Medications    furosemide (LASIX) 20 MG tablet    olmesartan (BENICAR) 40 MG tablet    Hyperlipidemia, mixed    Relevant Medications    rosuvastatin (CRESTOR) 20 MG tablet       Health Encounters    General medical exam - Primary    Current Assessment & Plan     Reviewed health maintenance, screening, and vaccination options.    Encourage low-dose lung CT and bone density to get up-to-date and they will consider.    Declined vaccination update    Declined COVID vaccination    Encouraged advance directive            Hematology and Neoplasia    History of left breast cancer    Current Assessment & Plan     Continue oncology follow-up.  They are managing her fEMARA         Relevant Medications    letrozole (FEMARA) 2.5 MG tablet       Mental Health    Anxiety associated with depression    Current Assessment & Plan     Patient's depression is recurrent and is mild  without psychosis. Their depression is currently in partial remission and the condition is improving with treatment. This will be reassessed at the next regular appointment. F/U as described:patient will continue current medication therapy.    We will go up to 50 mg of Zoloft given some mild flareups with anxiety.  Recheck in 6 months if doing well or sooner if problems arise         Relevant Medications    sertraline (ZOLOFT) 50 MG tablet       Neuro    Seizure disorder as sequela of cerebrovascular accident (HCC)    Current Assessment & Plan     Continues with Dilantin.    She has had some breakthrough episodes following her COVID infection with seizures.    We will get a Dilantin level checked with her recent fasting lab         Relevant Medications    phenytoin ER (DILANTIN) 100 MG capsule      Other Visit Diagnoses     History of stroke        Relevant Medications    aspirin (aspirin) 81 MG EC tablet        Patient Self-Management and Personalized Health Advice  The patient has been provided with information about: diet and exercise and preventive services including:   · Annual Wellness Visit (AWV).    Outpatient Encounter Medications as of 8/15/2022   Medication Sig Dispense Refill   • furosemide (LASIX) 20 MG tablet Take 1/2 tab po daily for blood pressure 45 tablet 1   • letrozole (FEMARA) 2.5 MG tablet Take 1 tablet by mouth Daily.  0   • multivitamin with minerals tablet tablet Take 1 tablet by mouth Daily.     • olmesartan (BENICAR) 40 MG tablet Take 1 tab po daily for blood pressure 90 tablet 1   • phenytoin ER (DILANTIN) 100 MG capsule Take 1 tab po TID for seizure prevention 270 capsule 1   • rosuvastatin (CRESTOR) 20 MG tablet Take 1 tab po daily for cholesterol 90 tablet 1   • sertraline (ZOLOFT) 50 MG tablet Take 1 tab po daily for mood 90 tablet 1   • [DISCONTINUED] Aspirin Buf,CaCarb-MgCarb-MgO, 81 MG tablet Take 1 tablet by mouth Daily. 90 tablet 3   • [DISCONTINUED] furosemide (LASIX) 20 MG  tablet Take 1/2 tab po daily for blood pressure 15 tablet 0   • [DISCONTINUED] letrozole (FEMARA) 2.5 MG tablet Take 1 tablet by mouth Daily. 90 tablet 0   • [DISCONTINUED] lidocaine (LIDODERM) 5 % Place 1 patch on the skin as directed by provider Daily. Remove & Discard patch within 12 hours or as directed by MD 30 patch 3   • [DISCONTINUED] olmesartan (BENICAR) 40 MG tablet Take 1 tab po daily for blood pressure 30 tablet 0   • [DISCONTINUED] phenytoin ER (DILANTIN) 100 MG capsule Take 1 tab po TID for seizure prevention 270 capsule 1   • [DISCONTINUED] rosuvastatin (CRESTOR) 20 MG tablet Take 1 tab po daily for cholesterol 90 tablet 1   • [DISCONTINUED] sertraline (ZOLOFT) 25 MG tablet Take 1 tab po daily for mood 30 tablet 0   • [DISCONTINUED] sodium bicarbonate 650 MG tablet Take 1 tablet by mouth 2 (Two) Times a Day. 60 tablet 0   • [DISCONTINUED] tamsulosin (FLOMAX) 0.4 MG capsule 24 hr capsule Take 1 tab po daily to help empty urine 90 capsule 1   • aspirin (aspirin) 81 MG EC tablet Take 1 tablet by mouth Daily.       No facility-administered encounter medications on file as of 8/15/2022.       Reviewed use of high risk medication in the elderly: yes  Reviewed for potential of harmful drug interactions in the elderly: yes    Diagnoses and all orders for this visit:    1. General medical exam (Primary)  Assessment & Plan:  Reviewed health maintenance, screening, and vaccination options.    Encourage low-dose lung CT and bone density to get up-to-date and they will consider.    Declined vaccination update    Declined COVID vaccination    Encouraged advance directive      2. Hypertension, essential  Assessment & Plan:  Hypertension is improving with treatment.  Continue current treatment regimen.  Blood pressure will be reassessed at the next regular appointment.    Orders:  -     furosemide (LASIX) 20 MG tablet; Take 1/2 tab po daily for blood pressure  Dispense: 45 tablet; Refill: 1  -     olmesartan  (BENICAR) 40 MG tablet; Take 1 tab po daily for blood pressure  Dispense: 90 tablet; Refill: 1    3. History of left breast cancer  Assessment & Plan:  Continue oncology follow-up.  They are managing her fEMARA    Orders:  -     letrozole (FEMARA) 2.5 MG tablet; Take 1 tablet by mouth Daily.; Refill: 0    4. Hyperlipidemia, mixed  -     rosuvastatin (CRESTOR) 20 MG tablet; Take 1 tab po daily for cholesterol  Dispense: 90 tablet; Refill: 1    5. Anxiety associated with depression  Assessment & Plan:  Patient's depression is recurrent and is mild without psychosis. Their depression is currently in partial remission and the condition is improving with treatment. This will be reassessed at the next regular appointment. F/U as described:patient will continue current medication therapy.    We will go up to 50 mg of Zoloft given some mild flareups with anxiety.  Recheck in 6 months if doing well or sooner if problems arise    Orders:  -     sertraline (ZOLOFT) 50 MG tablet; Take 1 tab po daily for mood  Dispense: 90 tablet; Refill: 1    6. Seizure disorder as sequela of cerebrovascular accident (HCC)  Assessment & Plan:  Continues with Dilantin.    She has had some breakthrough episodes following her COVID infection with seizures.    We will get a Dilantin level checked with her recent fasting lab    Orders:  -     phenytoin ER (DILANTIN) 100 MG capsule; Take 1 tab po TID for seizure prevention  Dispense: 270 capsule; Refill: 1    7. History of stroke  -     aspirin (aspirin) 81 MG EC tablet; Take 1 tablet by mouth Daily.      Follow Up:  Return in about 6 months (around 2/15/2023) for Med recheck.     There are no Patient Instructions on file for this visit.    An After Visit Summary and PPPS with all of these plans were given to the patient.

## 2022-08-15 NOTE — ASSESSMENT & PLAN NOTE
Continues with Dilantin.    She has had some breakthrough episodes following her COVID infection with seizures.    We will get a Dilantin level checked with her recent fasting lab

## 2022-08-15 NOTE — ASSESSMENT & PLAN NOTE
Patient's depression is recurrent and is mild without psychosis. Their depression is currently in partial remission and the condition is improving with treatment. This will be reassessed at the next regular appointment. F/U as described:patient will continue current medication therapy.    We will go up to 50 mg of Zoloft given some mild flareups with anxiety.  Recheck in 6 months if doing well or sooner if problems arise

## 2022-08-15 NOTE — ASSESSMENT & PLAN NOTE
Reviewed health maintenance, screening, and vaccination options.    Encourage low-dose lung CT and bone density to get up-to-date and they will consider.    Declined vaccination update    Declined COVID vaccination    Encouraged advance directive

## 2022-11-02 ENCOUNTER — TELEPHONE (OUTPATIENT)
Dept: FAMILY MEDICINE CLINIC | Facility: CLINIC | Age: 77
End: 2022-11-02

## 2022-11-02 NOTE — TELEPHONE ENCOUNTER
TOMI ROCHA OF [facility] PAPERWORK AND WOULD LIKE TO KNOW THE STATUS.  NEEDS TO BE SUBMITTED SOON.      PLEASE CALL [phone]

## 2023-02-04 DIAGNOSIS — F41.8 ANXIETY ASSOCIATED WITH DEPRESSION: ICD-10-CM

## 2023-03-20 ENCOUNTER — OFFICE VISIT (OUTPATIENT)
Dept: FAMILY MEDICINE CLINIC | Facility: CLINIC | Age: 78
End: 2023-03-20
Payer: MEDICARE

## 2023-03-20 ENCOUNTER — TELEPHONE (OUTPATIENT)
Dept: FAMILY MEDICINE CLINIC | Facility: CLINIC | Age: 78
End: 2023-03-20

## 2023-03-20 VITALS
HEIGHT: 67 IN | HEART RATE: 62 BPM | DIASTOLIC BLOOD PRESSURE: 80 MMHG | OXYGEN SATURATION: 98 % | WEIGHT: 150 LBS | BODY MASS INDEX: 23.54 KG/M2 | SYSTOLIC BLOOD PRESSURE: 132 MMHG

## 2023-03-20 DIAGNOSIS — I69.398 SEIZURE DISORDER AS SEQUELA OF CEREBROVASCULAR ACCIDENT: ICD-10-CM

## 2023-03-20 DIAGNOSIS — G40.909 SEIZURE DISORDER: ICD-10-CM

## 2023-03-20 DIAGNOSIS — G40.909 SEIZURE DISORDER AS SEQUELA OF CEREBROVASCULAR ACCIDENT: ICD-10-CM

## 2023-03-20 DIAGNOSIS — Z86.73 HISTORY OF STROKE: ICD-10-CM

## 2023-03-20 DIAGNOSIS — E78.2 HYPERLIPIDEMIA, MIXED: ICD-10-CM

## 2023-03-20 DIAGNOSIS — D63.1 ANEMIA IN STAGE 3B CHRONIC KIDNEY DISEASE: ICD-10-CM

## 2023-03-20 DIAGNOSIS — F41.8 ANXIETY ASSOCIATED WITH DEPRESSION: ICD-10-CM

## 2023-03-20 DIAGNOSIS — N18.32 ANEMIA IN STAGE 3B CHRONIC KIDNEY DISEASE: ICD-10-CM

## 2023-03-20 DIAGNOSIS — F32.A DEPRESSIVE DISORDER: ICD-10-CM

## 2023-03-20 DIAGNOSIS — Z79.899 HIGH RISK MEDICATION USE: ICD-10-CM

## 2023-03-20 DIAGNOSIS — Z86.73 PERSONAL HISTORY OF TRANSIENT ISCHEMIC ATTACK (TIA), AND CEREBRAL INFARCTION WITHOUT RESIDUAL DEFICITS: ICD-10-CM

## 2023-03-20 DIAGNOSIS — M17.12 PRIMARY OSTEOARTHRITIS OF LEFT KNEE: ICD-10-CM

## 2023-03-20 DIAGNOSIS — Z85.3 HISTORY OF LEFT BREAST CANCER: ICD-10-CM

## 2023-03-20 DIAGNOSIS — I10 HYPERTENSION, ESSENTIAL: ICD-10-CM

## 2023-03-20 DIAGNOSIS — R41.3 MEMORY IMPAIRMENT: ICD-10-CM

## 2023-03-20 DIAGNOSIS — N18.32 STAGE 3B CHRONIC KIDNEY DISEASE: Primary | ICD-10-CM

## 2023-03-20 DIAGNOSIS — Z85.3 PERSONAL HISTORY OF BREAST CANCER: ICD-10-CM

## 2023-03-20 DIAGNOSIS — D50.8 OTHER IRON DEFICIENCY ANEMIA: ICD-10-CM

## 2023-03-20 DIAGNOSIS — I69.30 SEQUELA OF CEREBROVASCULAR ACCIDENT: ICD-10-CM

## 2023-03-20 PROBLEM — N25.89 RENAL TUBULAR ACIDOSIS: Status: RESOLVED | Noted: 2022-04-12 | Resolved: 2023-03-20

## 2023-03-20 PROBLEM — D50.9 IRON DEFICIENCY ANEMIA: Chronic | Status: ACTIVE | Noted: 2022-06-17

## 2023-03-20 PROBLEM — R73.9 HYPERGLYCEMIA: Status: RESOLVED | Noted: 2022-06-17 | Resolved: 2023-03-20

## 2023-03-20 PROBLEM — R33.9 URINE RETENTION: Status: RESOLVED | Noted: 2022-06-17 | Resolved: 2023-03-20

## 2023-03-20 PROBLEM — I12.9 UNSPECIFIED HYPERTENSIVE KIDNEY DISEASE WITH CHRONIC KIDNEY DISEASE STAGE I THROUGH STAGE IV, OR UNSPECIFIED: Status: RESOLVED | Noted: 2022-04-12 | Resolved: 2023-03-20

## 2023-03-20 PROBLEM — R60.9 EDEMA: Status: RESOLVED | Noted: 2022-04-12 | Resolved: 2023-03-20

## 2023-03-20 PROBLEM — I50.9 CONGESTIVE HEART FAILURE: Status: RESOLVED | Noted: 2022-04-12 | Resolved: 2023-03-20

## 2023-03-20 PROBLEM — E11.65 TYPE 2 DIABETES MELLITUS WITH HYPERGLYCEMIA: Status: RESOLVED | Noted: 2022-06-17 | Resolved: 2023-03-20

## 2023-03-20 PROBLEM — R09.89 POOR PERIPHERAL CIRCULATION: Status: RESOLVED | Noted: 2022-05-13 | Resolved: 2023-03-20

## 2023-03-20 PROBLEM — F44.5 DISSOCIATIVE CONVULSIONS: Status: RESOLVED | Noted: 2020-01-16 | Resolved: 2023-03-20

## 2023-03-20 PROBLEM — N18.9 ANEMIA IN CHRONIC KIDNEY DISEASE: Chronic | Status: ACTIVE | Noted: 2022-04-12

## 2023-03-20 PROBLEM — N25.81 SECONDARY HYPERPARATHYROIDISM OF RENAL ORIGIN: Status: RESOLVED | Noted: 2022-04-12 | Resolved: 2023-03-20

## 2023-03-20 PROCEDURE — 3075F SYST BP GE 130 - 139MM HG: CPT | Performed by: FAMILY MEDICINE

## 2023-03-20 PROCEDURE — 99214 OFFICE O/P EST MOD 30 MIN: CPT | Performed by: FAMILY MEDICINE

## 2023-03-20 PROCEDURE — 3079F DIAST BP 80-89 MM HG: CPT | Performed by: FAMILY MEDICINE

## 2023-03-20 PROCEDURE — 36415 COLL VENOUS BLD VENIPUNCTURE: CPT | Performed by: FAMILY MEDICINE

## 2023-03-20 PROCEDURE — 1159F MED LIST DOCD IN RCRD: CPT | Performed by: FAMILY MEDICINE

## 2023-03-20 PROCEDURE — 1160F RVW MEDS BY RX/DR IN RCRD: CPT | Performed by: FAMILY MEDICINE

## 2023-03-20 RX ORDER — ASPIRIN 81 MG/1
81 TABLET ORAL DAILY
Start: 2023-03-20

## 2023-03-20 RX ORDER — FUROSEMIDE 20 MG/1
TABLET ORAL
Qty: 45 TABLET | Refills: 1 | Status: SHIPPED | OUTPATIENT
Start: 2023-03-20

## 2023-03-20 RX ORDER — ROSUVASTATIN CALCIUM 20 MG/1
TABLET, COATED ORAL
Qty: 90 TABLET | Refills: 1 | Status: SHIPPED | OUTPATIENT
Start: 2023-03-20 | End: 2023-03-21 | Stop reason: SDUPTHER

## 2023-03-20 RX ORDER — OLMESARTAN MEDOXOMIL 40 MG/1
TABLET ORAL
Qty: 90 TABLET | Refills: 1 | Status: SHIPPED | OUTPATIENT
Start: 2023-03-20

## 2023-03-20 RX ORDER — PHENYTOIN SODIUM 100 MG/1
300 CAPSULE, EXTENDED RELEASE ORAL NIGHTLY
Qty: 270 CAPSULE | Refills: 1 | Status: SHIPPED | OUTPATIENT
Start: 2023-03-20 | End: 2023-09-20 | Stop reason: SDUPTHER

## 2023-03-20 RX ORDER — PHENYTOIN SODIUM 100 MG/1
300 CAPSULE, EXTENDED RELEASE ORAL NIGHTLY
Qty: 270 CAPSULE | Refills: 1 | Status: SHIPPED | OUTPATIENT
Start: 2023-03-20 | End: 2023-03-20 | Stop reason: SDUPTHER

## 2023-03-20 RX ORDER — TAMSULOSIN HYDROCHLORIDE 0.4 MG/1
CAPSULE ORAL
COMMUNITY
Start: 2023-02-04 | End: 2023-03-20

## 2023-03-20 NOTE — PROGRESS NOTES
"Chief Complaint  Med Refill    Subjective    History of Present Illness:  Sirena Chong is a 77 y.o. female who presents today for 6-month follow-up visit and medication refills.    Doing well since our last visit together for her Medicare annual wellness visit and physical in August 2022.    She is having ongoing problems with bilateral knee osteoarthritis which has worsened somewhat since our last appointment in August.  She has not had any falls but is completely wheelchair-bound at this time.  She is taking 500 mg of Tylenol in the morning and at 1000 mg of Tylenol at bedtime.  We did discuss she has room to increase to 1000 mg 3 times a day and added Voltaren gel topically.    She is fasting and is willing to get lab work up-to-date today.  History of stage III chronic kidney disease with anemia related to chronic kidney disease.  She had been on iron in the past due to low iron stores but her last levels were good.  It has been sometime since she got blood work rechecked with us.       She continues to decline vaccination for COVID.     History of diabetes listed on her chart but her last A1c was under under 5.7 on no medications.     Discussed with her son DEXA and low-dose lung CT which are past due and they want to hold off at this time on health maintenance and screening.    Ongoing oncology follow-up given history of breast cancer.  Continues on Femara.        Objective   Vital Signs:   /80 (BP Location: Left arm, Patient Position: Sitting, Cuff Size: Adult)   Pulse 62   Ht 170.2 cm (67\")   Wt 68 kg (150 lb)   SpO2 98%   BMI 23.49 kg/m²     Review of Systems   Constitutional: Negative for appetite change, chills and fever.   HENT: Negative for hearing loss.    Eyes: Negative for blurred vision.   Respiratory: Negative for chest tightness.    Cardiovascular: Negative for chest pain.   Gastrointestinal: Negative for abdominal pain.   Musculoskeletal: Positive for arthralgias and gait problem.     "    Bilateral knee arthritis.  Resting in wheelchair   Skin: Negative for rash.   Psychiatric/Behavioral: Negative for depressed mood.       Past History:  Medical History: has a past medical history of Alcohol abuse, Anemia, iron deficiency, Aspiration pneumonia ([facility]), Breast cancer, left ([facility]) (2017), Chronic vertigo, CVA (cerebral vascular accident) ([facility]) (02/2016), Depression, Diabetes ([facility]), Hypertension, Nephrolithiasis, Seizure ([facility]) (02/2016), Tobacco abuse, and UTI (urinary tract infection).   Surgical History: has a past surgical history that includes Tonsillectomy and Appendectomy.   Family History: family history includes Diabetes in her mother; Hypertension in her mother; Stroke in her mother.   Social History: reports that she quit smoking about 7 years ago. Her smoking use included cigarettes. She has a 25.00 pack-year smoking history. She has never used smokeless tobacco. She reports that she does not currently use alcohol. She reports that she does not use drugs.      Current Outpatient Medications:   •  aspirin 81 MG EC tablet, Take 1 tablet by mouth Daily., Disp: , Rfl:   •  furosemide (LASIX) 20 MG tablet, Take 1/2 tab po daily for blood pressure, Disp: 45 tablet, Rfl: 1  •  letrozole (FEMARA) 2.5 MG tablet, Take 1 tablet by mouth Daily., Disp: , Rfl: 0  •  multivitamin with minerals tablet tablet, Take 1 tablet by mouth Daily., Disp: , Rfl:   •  olmesartan (BENICAR) 40 MG tablet, Take 1 tab po daily for blood pressure, Disp: 90 tablet, Rfl: 1  •  phenytoin ER (DILANTIN) 100 MG capsule, Take 3 capsules by mouth Every Night. Take 1 tab po TID for seizure prevention, Disp: 270 capsule, Rfl: 1  •  rosuvastatin (CRESTOR) 20 MG tablet, Take 1 tab po daily for cholesterol, Disp: 90 tablet, Rfl: 1  •  sertraline (ZOLOFT) 50 MG tablet, TAKE ONE TABLET BY MOUTH DAILY FOR MOOD, Disp: 90 tablet, Rfl: 1    Allergies: Patient has no known allergies.    Physical Exam  HENT:      Head: Normocephalic.      Right  Ear: External ear normal.      Left Ear: External ear normal.      Nose: Nose normal.   Eyes:      Pupils: Pupils are equal, round, and reactive to light.   Cardiovascular:      Rate and Rhythm: Normal rate and regular rhythm.      Heart sounds: Normal heart sounds.   Pulmonary:      Effort: Pulmonary effort is normal.      Breath sounds: Normal breath sounds.   Musculoskeletal:      Cervical back: Normal range of motion.      Comments: Resting in wheelchair.  Bilateral knee osteoarthritis and crepitus.  Gait limited with history of stroke and severity of knee   Skin:     General: Skin is warm and dry.   Neurological:      Mental Status: She is alert. Mental status is at baseline.   Psychiatric:         Mood and Affect: Mood normal.         Behavior: Behavior normal.          Result Review                   Assessment and Plan  Diagnoses and all orders for this visit:    1. Stage 3b chronic kidney disease (HCC) (Primary)  Assessment & Plan:  Renal condition is unchanged.  Continue current treatment regimen.  Renal condition will be reassessed in 6 months.      2. Sequela of cerebrovascular accident  Assessment & Plan:  Wheelchair-bound given history of stroke with chronic weakness and severity of knee osteoarthritis.    Continue low-dose aspirin, Benicar, and Crestor.  Awaiting check on fasting labs      3. Personal history of transient ischemic attack (TIA), and cerebral infarction without residual deficits  Assessment & Plan:  Continue aspirin, Benicar, and Crestor      4. Personal history of breast cancer  Assessment & Plan:  Ongoing follow-up with oncology      5. Memory impairment  Assessment & Plan:  Stable after CVA      6. Seizure disorder (HCC)  Assessment & Plan:  Controlled on Dilantin.  History of poststroke seizures and has had seizures most recently 2022.  We will check Dilantin and folic acid level with labs today    Orders:  -     Folate; Future  -     Folate    7. Depressive disorder  Assessment &  Plan:  Patient's depression is recurrent and is mild without psychosis. Their depression is currently in full remission and the condition is improving with treatment. This will be reassessed at the next regular appointment. F/U as described:patient will continue current medication therapy.    Orders:  -     sertraline (ZOLOFT) 50 MG tablet; TAKE ONE TABLET BY MOUTH DAILY FOR MOOD  Dispense: 90 tablet; Refill: 1    8. Anemia in stage 3b chronic kidney disease (HCC)  Assessment & Plan:  Awaiting recheck on anemia studies.    Last hemoglobin 9.1 with hospital discharge following COVID in 2022.      Orders:  -     CBC Auto Differential; Future  -     Ferritin; Future  -     Iron Profile; Future  -     CBC Auto Differential  -     Ferritin  -     Iron Profile    9. Other iron deficiency anemia  Assessment & Plan:  Awaiting recheck on iron studies    Orders:  -     CBC Auto Differential; Future  -     Ferritin; Future  -     Iron Profile; Future  -     CBC Auto Differential  -     Ferritin  -     Iron Profile    10. History of stroke  -     aspirin 81 MG EC tablet; Take 1 tablet by mouth Daily.    11. Hypertension, essential  Assessment & Plan:  Hypertension is improving with treatment.  Continue current treatment regimen.  Blood pressure will be reassessed at the next regular appointment.    Orders:  -     furosemide (LASIX) 20 MG tablet; Take 1/2 tab po daily for blood pressure  Dispense: 45 tablet; Refill: 1  -     olmesartan (BENICAR) 40 MG tablet; Take 1 tab po daily for blood pressure  Dispense: 90 tablet; Refill: 1  -     CBC Auto Differential; Future  -     Comprehensive Metabolic Panel; Future  -     Lipid Panel; Future  -     TSH; Future  -     T4, Free; Future  -     CBC Auto Differential  -     Comprehensive Metabolic Panel  -     Lipid Panel  -     TSH  -     T4, Free    12. Seizure disorder as sequela of cerebrovascular accident (HCC)  -     phenytoin ER (DILANTIN) 100 MG capsule; Take 3 capsules by mouth  Every Night. Take 1 tab po TID for seizure prevention  Dispense: 270 capsule; Refill: 1    13. Hyperlipidemia, mixed  Assessment & Plan:  Lipid abnormalities are improving with treatment.  Pharmacotherapy as ordered.  Lipids will be reassessed in 6 months.    Orders:  -     rosuvastatin (CRESTOR) 20 MG tablet; Take 1 tab po daily for cholesterol  Dispense: 90 tablet; Refill: 1    14. Anxiety associated with depression  -     sertraline (ZOLOFT) 50 MG tablet; TAKE ONE TABLET BY MOUTH DAILY FOR MOOD  Dispense: 90 tablet; Refill: 1    15. High risk medication use  -     Folate; Future  -     Folate    16. Primary osteoarthritis of left knee  Assessment & Plan:  Discussed Tylenol dosing can be increased to 1000 mg 3 times a day and they may add in Voltaren gel topically as needed.      17. History of left breast cancer      BMI is within normal parameters. No other follow-up for BMI required.          Follow Up  Return in about 6 months (around 9/20/2023) for Medicare Wellness, Annual physical, Fasting for labs at appointment (but drink water!).    Patrick Cotto MD

## 2023-03-20 NOTE — ASSESSMENT & PLAN NOTE
Wheelchair-bound given history of stroke with chronic weakness and severity of knee osteoarthritis.    Continue low-dose aspirin, Benicar, and Crestor.  Awaiting check on fasting labs

## 2023-03-20 NOTE — TELEPHONE ENCOUNTER
Updated prescription refill.  New Dilantin prescription is 3 at bedtime for seizure prevention.  Not 3 times daily dosing.  At the

## 2023-03-20 NOTE — TELEPHONE ENCOUNTER
Caller: [facility] [id number] [facility] [address] - [phone]  - [phone]    Relationship: Pharmacy    Best call back number: [phone]    Requested Prescriptions:   Requested Prescriptions     Pending Prescriptions Disp Refills   • phenytoin ER (DILANTIN) 100 MG capsule 270 capsule 1     Sig: Take 3 capsules by mouth Every Night. Take 1 tab po TID for seizure prevention        Pharmacy where request should be sent:      Additional details provided by patient: [facility] WANTED CLARIFICATION ON DIRECTIONS        Would you like a call back once the refill request has been completed: [x] Yes [] No    If the office needs to give you a call back, can they leave a voicemail: [] Yes [] No    Cori Fiore Rep   03/20/23 12:36 EDT

## 2023-03-20 NOTE — ASSESSMENT & PLAN NOTE
Discussed Tylenol dosing can be increased to 1000 mg 3 times a day and they may add in Voltaren gel topically as needed.

## 2023-03-20 NOTE — ASSESSMENT & PLAN NOTE
Controlled on Dilantin.  History of poststroke seizures and has had seizures most recently 2022.  We will check Dilantin and folic acid level with labs today

## 2023-03-20 NOTE — ASSESSMENT & PLAN NOTE
Awaiting recheck on anemia studies.    Last hemoglobin 9.1 with hospital discharge following COVID in 2022.

## 2023-03-21 DIAGNOSIS — E78.2 HYPERLIPIDEMIA, MIXED: ICD-10-CM

## 2023-03-21 LAB
ALBUMIN SERPL-MCNC: 4.4 G/DL (ref 3.7–4.7)
ALBUMIN/GLOB SERPL: 1.4 {RATIO} (ref 1.2–2.2)
ALP SERPL-CCNC: 135 IU/L (ref 44–121)
ALT SERPL-CCNC: 9 IU/L (ref 0–32)
AST SERPL-CCNC: 16 IU/L (ref 0–40)
BASOPHILS # BLD AUTO: 0.1 X10E3/UL (ref 0–0.2)
BASOPHILS NFR BLD AUTO: 1 %
BILIRUB SERPL-MCNC: 0.2 MG/DL (ref 0–1.2)
BUN SERPL-MCNC: 12 MG/DL (ref 8–27)
BUN/CREAT SERPL: 12 (ref 12–28)
CALCIUM SERPL-MCNC: 10.1 MG/DL (ref 8.7–10.3)
CHLORIDE SERPL-SCNC: 95 MMOL/L (ref 96–106)
CHOLEST SERPL-MCNC: 199 MG/DL (ref 100–199)
CO2 SERPL-SCNC: 27 MMOL/L (ref 20–29)
CREAT SERPL-MCNC: 1 MG/DL (ref 0.57–1)
EGFRCR SERPLBLD CKD-EPI 2021: 58 ML/MIN/1.73
EOSINOPHIL # BLD AUTO: 0.1 X10E3/UL (ref 0–0.4)
EOSINOPHIL NFR BLD AUTO: 1 %
ERYTHROCYTE [DISTWIDTH] IN BLOOD BY AUTOMATED COUNT: 14.4 % (ref 11.7–15.4)
FERRITIN SERPL-MCNC: 184 NG/ML (ref 15–150)
FOLATE SERPL-MCNC: 4.8 NG/ML
GLOBULIN SER CALC-MCNC: 3.1 G/DL (ref 1.5–4.5)
GLUCOSE SERPL-MCNC: 103 MG/DL (ref 70–99)
HCT VFR BLD AUTO: 36.8 % (ref 34–46.6)
HDLC SERPL-MCNC: 61 MG/DL
HGB BLD-MCNC: 11.9 G/DL (ref 11.1–15.9)
IMM GRANULOCYTES # BLD AUTO: 0 X10E3/UL (ref 0–0.1)
IMM GRANULOCYTES NFR BLD AUTO: 0 %
IRON SATN MFR SERPL: 32 % (ref 15–55)
IRON SERPL-MCNC: 80 UG/DL (ref 27–139)
LDLC SERPL CALC-MCNC: 116 MG/DL (ref 0–99)
LYMPHOCYTES # BLD AUTO: 2.2 X10E3/UL (ref 0.7–3.1)
LYMPHOCYTES NFR BLD AUTO: 29 %
MCH RBC QN AUTO: 28.1 PG (ref 26.6–33)
MCHC RBC AUTO-ENTMCNC: 32.3 G/DL (ref 31.5–35.7)
MCV RBC AUTO: 87 FL (ref 79–97)
MONOCYTES # BLD AUTO: 0.5 X10E3/UL (ref 0.1–0.9)
MONOCYTES NFR BLD AUTO: 6 %
NEUTROPHILS # BLD AUTO: 4.9 X10E3/UL (ref 1.4–7)
NEUTROPHILS NFR BLD AUTO: 63 %
PLATELET # BLD AUTO: 332 X10E3/UL (ref 150–450)
POTASSIUM SERPL-SCNC: 3.5 MMOL/L (ref 3.5–5.2)
PROT SERPL-MCNC: 7.5 G/DL (ref 6–8.5)
RBC # BLD AUTO: 4.23 X10E6/UL (ref 3.77–5.28)
SODIUM SERPL-SCNC: 138 MMOL/L (ref 134–144)
T4 FREE SERPL-MCNC: 1.03 NG/DL (ref 0.82–1.77)
TIBC SERPL-MCNC: 247 UG/DL (ref 250–450)
TRIGL SERPL-MCNC: 124 MG/DL (ref 0–149)
TSH SERPL DL<=0.005 MIU/L-ACNC: 1.6 UIU/ML (ref 0.45–4.5)
UIBC SERPL-MCNC: 167 UG/DL (ref 118–369)
VLDLC SERPL CALC-MCNC: 22 MG/DL (ref 5–40)
WBC # BLD AUTO: 7.7 X10E3/UL (ref 3.4–10.8)

## 2023-03-21 RX ORDER — ROSUVASTATIN CALCIUM 40 MG/1
40 TABLET, COATED ORAL DAILY
Qty: 90 TABLET | Refills: 1 | Status: SHIPPED | OUTPATIENT
Start: 2023-03-21

## 2023-03-21 NOTE — PROGRESS NOTES
THE MESSAGE BELOW IS ABLE TO BE GIVEN BY THE HUB.  THE HUB MAY SCHEDULE A FOLLOW-UP VISIT FOR THE PATIENT IF INDICATED IN THE MESSAGE BELOW...    Please contact patient's son or patient with lab results that have returned thus far:    Her cholesterol returned with mild LDL cholesterol elevation.  Please let her know that I increased her Crestor dosing to 40 mg for better cholesterol control along with heart protection and stroke prevention.  She can take 2 tablets of the 20 mg Crestor until she is out of that dose and then start the new 40 mg dosing.    Her metabolic panel returned with stable kidney function, mild blood sugar elevation in the prediabetes range, and stable liver enzymes.    Her blood count returned normal with no anemia or evidence for infection.    We are still waiting on her thyroid studies and vitamin levels to return and we will contact her with those lab results when they become available

## 2023-03-21 NOTE — PROGRESS NOTES
THE MESSAGE BELOW IS ABLE TO BE GIVEN BY THE HUB.  THE HUB MAY SCHEDULE A FOLLOW-UP VISIT FOR THE PATIENT IF INDICATED IN THE MESSAGE BELOW...    Please contact patient and/or patient's son with the remainder of her labs:    Her iron stores returned normal with slight elevation in her ferritin level.  No iron supplementation is needed at this time.    Her folic acid level returned normal.    Her thyroid blood work returned normal.    Please see the previous message for the remainder of her lab results.

## 2023-03-31 ENCOUNTER — TELEPHONE (OUTPATIENT)
Dept: FAMILY MEDICINE CLINIC | Facility: CLINIC | Age: 78
End: 2023-03-31
Payer: MEDICARE

## 2023-03-31 NOTE — TELEPHONE ENCOUNTER
Caller: TOMI OLEA    Relationship: Emergency Contact    Best call back number: [phone]    What was the call regarding:   PATIENT'S (SON) TOMI WOULD LIKE A CALL BACK REGARDING SOME QUESTION'S HE HAS ABOUT PATIENT'S MEDICATION'S     Do you require a callback: YES

## 2023-04-04 NOTE — TELEPHONE ENCOUNTER
Spoke to patients son he states that his mother had been taking dilantin though out that day and 3 times at night, told him to stop medication through out the day and only take it at night according to rx

## 2023-09-01 ENCOUNTER — TELEPHONE (OUTPATIENT)
Dept: FAMILY MEDICINE CLINIC | Facility: CLINIC | Age: 78
End: 2023-09-01

## 2023-09-01 NOTE — TELEPHONE ENCOUNTER
Caller: [facility]    Relationship: Emergency Contact    Best call back number: [phone]     What is the medical concern/diagnosis: MEDICAL CONCERNS    What specialty or service is being requested: HOME HEALTH; NURSING     What is the provider, practice or medical service name: [facility]     What is the office location: [address] [place] [address]    What is the office phone number: [phone]    Any additional details: PATIENTS SON IS REQUESTING HOME HEALTH SERVICES. HE HAS CHANGED HIS WORK SCHEDULE TO WORK AT NIGHT WHILE SHE IS ASLEEP BUT SHE HAS BEEN GETTING UP AND DOING THINGS SHE SHOULDN'T. PATIENTS SON IS CONCERNED AND NEEDS SOME ASSISTANCE. PLEASE ADVISE

## 2023-09-11 NOTE — TELEPHONE ENCOUNTER
She has to be seen within 90-days for a new home health order to be placed (per requirements from home health).  Please have them keep their appointment this month and after that we will be able to discuss and arrange for home health

## 2023-09-13 NOTE — TELEPHONE ENCOUNTER
Message given to pt son. He verbalizes understanding of message and will discuss at next week appt.

## 2023-09-20 ENCOUNTER — OFFICE VISIT (OUTPATIENT)
Dept: FAMILY MEDICINE CLINIC | Facility: CLINIC | Age: 78
End: 2023-09-20
Payer: MEDICARE

## 2023-09-20 VITALS
OXYGEN SATURATION: 97 % | HEIGHT: 67 IN | BODY MASS INDEX: 23.49 KG/M2 | SYSTOLIC BLOOD PRESSURE: 128 MMHG | DIASTOLIC BLOOD PRESSURE: 60 MMHG | HEART RATE: 55 BPM

## 2023-09-20 DIAGNOSIS — Z86.73 PERSONAL HISTORY OF TRANSIENT ISCHEMIC ATTACK (TIA), AND CEREBRAL INFARCTION WITHOUT RESIDUAL DEFICITS: Chronic | ICD-10-CM

## 2023-09-20 DIAGNOSIS — N18.31 ANEMIA IN STAGE 3A CHRONIC KIDNEY DISEASE: Chronic | ICD-10-CM

## 2023-09-20 DIAGNOSIS — R73.9 HYPERGLYCEMIA: ICD-10-CM

## 2023-09-20 DIAGNOSIS — Z85.3 HISTORY OF LEFT BREAST CANCER: Chronic | ICD-10-CM

## 2023-09-20 DIAGNOSIS — E78.2 HYPERLIPIDEMIA, MIXED: Chronic | ICD-10-CM

## 2023-09-20 DIAGNOSIS — I69.998 WEAKNESS DUE TO OLD STROKE: ICD-10-CM

## 2023-09-20 DIAGNOSIS — Z00.00 GENERAL MEDICAL EXAM: Primary | ICD-10-CM

## 2023-09-20 DIAGNOSIS — R41.3 MEMORY IMPAIRMENT: Chronic | ICD-10-CM

## 2023-09-20 DIAGNOSIS — F41.8 ANXIETY ASSOCIATED WITH DEPRESSION: ICD-10-CM

## 2023-09-20 DIAGNOSIS — Z85.3 PERSONAL HISTORY OF BREAST CANCER: Chronic | ICD-10-CM

## 2023-09-20 DIAGNOSIS — R53.1 WEAKNESS DUE TO OLD STROKE: ICD-10-CM

## 2023-09-20 DIAGNOSIS — F32.A DEPRESSIVE DISORDER: Chronic | ICD-10-CM

## 2023-09-20 DIAGNOSIS — I69.30 SEQUELA OF CEREBROVASCULAR ACCIDENT: Chronic | ICD-10-CM

## 2023-09-20 DIAGNOSIS — D63.1 ANEMIA IN STAGE 3A CHRONIC KIDNEY DISEASE: Chronic | ICD-10-CM

## 2023-09-20 DIAGNOSIS — I10 HYPERTENSION, ESSENTIAL: Chronic | ICD-10-CM

## 2023-09-20 DIAGNOSIS — M17.12 PRIMARY OSTEOARTHRITIS OF LEFT KNEE: Chronic | ICD-10-CM

## 2023-09-20 DIAGNOSIS — Z86.73 HISTORY OF STROKE: ICD-10-CM

## 2023-09-20 DIAGNOSIS — I69.398 SEIZURE DISORDER AS SEQUELA OF CEREBROVASCULAR ACCIDENT: Chronic | ICD-10-CM

## 2023-09-20 DIAGNOSIS — G40.909 SEIZURE DISORDER AS SEQUELA OF CEREBROVASCULAR ACCIDENT: Chronic | ICD-10-CM

## 2023-09-20 PROBLEM — D50.9 IRON DEFICIENCY ANEMIA: Chronic | Status: RESOLVED | Noted: 2022-06-17 | Resolved: 2023-09-20

## 2023-09-20 PROBLEM — N18.32 STAGE 3B CHRONIC KIDNEY DISEASE: Chronic | Status: RESOLVED | Noted: 2022-04-12 | Resolved: 2023-09-20

## 2023-09-20 RX ORDER — SERTRALINE HYDROCHLORIDE 100 MG/1
TABLET, FILM COATED ORAL
Qty: 90 TABLET | Refills: 1 | Status: SHIPPED | OUTPATIENT
Start: 2023-09-20

## 2023-09-20 RX ORDER — FUROSEMIDE 20 MG/1
TABLET ORAL
Qty: 45 TABLET | Refills: 1 | Status: SHIPPED | OUTPATIENT
Start: 2023-09-20

## 2023-09-20 RX ORDER — OLMESARTAN MEDOXOMIL 40 MG/1
TABLET ORAL
Qty: 90 TABLET | Refills: 1 | Status: SHIPPED | OUTPATIENT
Start: 2023-09-20

## 2023-09-20 RX ORDER — PHENYTOIN SODIUM 100 MG/1
300 CAPSULE, EXTENDED RELEASE ORAL NIGHTLY
Qty: 270 CAPSULE | Refills: 1 | Status: SHIPPED | OUTPATIENT
Start: 2023-09-20

## 2023-09-20 RX ORDER — ROSUVASTATIN CALCIUM 40 MG/1
40 TABLET, COATED ORAL DAILY
Qty: 90 TABLET | Refills: 1 | Status: SHIPPED | OUTPATIENT
Start: 2023-09-20

## 2023-09-20 RX ORDER — ASPIRIN 81 MG/1
81 TABLET ORAL DAILY
Start: 2023-09-20

## 2023-09-20 NOTE — ASSESSMENT & PLAN NOTE
Stable after CVA    Mild flareups with depression discussed today.  We will go up to 100 mg of Zoloft and recheck at follow-up in 1 month

## 2023-09-20 NOTE — ASSESSMENT & PLAN NOTE
Patient's depression is recurrent and is moderate without psychosis. Their depression is currently active and the condition is improving with treatment. This will be reassessed at the next regular appointment. F/U as described: Zoloft increased to 100 mg.  Recheck in 1 month .

## 2023-09-20 NOTE — ASSESSMENT & PLAN NOTE
Discussed together health maintenance and screening along with vaccination options and healthy diet and exercise habits as part of the preventative counseling at their physical exam today.     Declines vaccination update.  Declines getting her mammogram up-to-date at this point of her remaining right breast.

## 2023-09-20 NOTE — PROGRESS NOTES
QUICK REFERENCE INFORMATION:  The ABCs of the Annual Wellness Visit    Subsequent Medicare Wellness Visit    @[email or web address]@    HEALTH RISK ASSESSMENT    1945    Recent Hospitalizations:  No hospitalization(s) within the last year..        Current Medical Providers:  Patient Care Team:  Patrick Cotto MD as PCP - General (Family Medicine)        Smoking Status:  Social History     Tobacco Use   Smoking Status Former    Packs/day: 1.00    Years: 25.00    Pack years: 25.00    Types: Cigarettes    Start date:     Quit date:     Years since quittin.7   Smokeless Tobacco Never       Alcohol Consumption:  Social History     Substance and Sexual Activity   Alcohol Use Not Currently       Depression Screen:       3/20/2023    11:16 AM   PHQ-2/PHQ-9 Depression Screening   Little Interest or Pleasure in Doing Things 3-->nearly every day   Feeling Down, Depressed or Hopeless 3-->nearly every day   Trouble Falling or Staying Asleep, or Sleeping Too Much 3-->nearly every day   Feeling Tired or Having Little Energy 3-->nearly every day   Poor Appetite or Overeating 3-->nearly every day   Feeling Bad about Yourself - or that You are a Failure or Have Let Yourself or Your Family Down 3-->nearly every day   Trouble Concentrating on Things, Such as Reading the Newspaper or Watching Television 3-->nearly every day   Moving or Speaking So Slowly that Other People Could Have Noticed? Or the Opposite - Being So Fidgety 3-->nearly every day   Thoughts that You Would be Better Off Dead or of Hurting Yourself in Some Way 0-->not at all   PHQ-9: Brief Depression Severity Measure Score 24   If You Checked Off Any Problems, How Difficult Have These Problems Made It For You to Do Your Work, Take Care of Things at Home, or Get Along with Other People? not difficult at all       Health Habits and Functional and Cognitive Screenin/20/2023     4:00 PM   Functional & Cognitive Status   Do you have difficulty preparing  food and eating? Yes   Do you have difficulty bathing yourself, getting dressed or grooming yourself? Yes   Do you have difficulty using the toilet? Yes   Do you have difficulty moving around from place to place? Yes   Do you have trouble with steps or getting out of a bed or a chair? Yes   Current Diet Well Balanced Diet   Dental Exam Not up to date   Eye Exam Not up to date   Exercise (times per week) 0 times per week   Current Exercises Include No Regular Exercise   Do you need help using the phone?  Yes   Are you deaf or do you have serious difficulty hearing?  No   Do you need help to go to places out of walking distance? Yes   Do you need help shopping? Yes   Do you need help preparing meals?  Yes   Do you need help with housework?  Yes   Do you need help with laundry? Yes   Do you need help taking your medications? Yes   Do you need help managing money? Yes   Do you ever drive or ride in a car without wearing a seat belt? No   Have you felt unusual stress, anger or loneliness in the last month? No   Who do you live with? Child   If you need help, do you have trouble finding someone available to you? No   Have you been bothered in the last four weeks by sexual problems? No   Do you have difficulty concentrating, remembering or making decisions? Yes       Fall Risk Screen:  KEITHADI Fall Risk Assessment was completed, and patient is at LOW risk for falls.Assessment completed on:3/20/2023    ACE III MINI        Does the patient have evidence of cognitive impairment? Yes    Aspirin use counseling: Taking ASA appropriately as indicated    Recent Lab Results:  CMP:  Lab Results   Component Value Date    BUN 12 03/20/2023    CREATININE 1.00 03/20/2023    BCR 12 03/20/2023     03/20/2023    K 3.5 03/20/2023    CO2 27 03/20/2023    CALCIUM 10.1 03/20/2023    PROTENTOTREF 7.5 03/20/2023    ALBUMIN 4.4 03/20/2023    LABGLOBREF 3.1 03/20/2023    LABIL2 1.4 03/20/2023    BILITOT 0.2 03/20/2023    ALKPHOS 135 (H)  03/20/2023    AST 16 03/20/2023    ALT 9 03/20/2023     HbA1c:  No results found for: HGBA1C  Microalbumin:  No results found for: MICROALBUR, POCMALB, POCCREAT  Lipid Panel  Lab Results   Component Value Date    TRIG 124 03/20/2023    HDL 61 03/20/2023     (H) 03/20/2023    AST 16 03/20/2023    ALT 9 03/20/2023       Visual Acuity:  No results found.    Age-appropriate Screening Schedule:  Refer to the list below for future screening recommendations based on patient's age, sex and/or medical conditions. Orders for these recommended tests are listed in the plan section. The patient has been provided with a written plan.    Health Maintenance   Topic Date Due    LIPID PANEL  03/20/2024    DXA SCAN  09/01/2024    ANNUAL WELLNESS VISIT  09/20/2024    HEPATITIS C SCREENING  Discontinued    COVID-19 Vaccine  Discontinued    INFLUENZA VACCINE  Discontinued    Pneumococcal Vaccine 65+  Discontinued    HEMOGLOBIN A1C  Discontinued    DIABETIC EYE EXAM  Discontinued    URINE MICROALBUMIN  Discontinued    TDAP/TD VACCINES  Discontinued    ZOSTER VACCINE  Discontinued        Subjective   History of Present Illness    Sirena Chong is a 78 y.o. female who presents for a Subsequent Wellness Visit and physical exam.    She is also here for 6 month followup visit and medication refills.     Doing well since our last visit together in March 2023.    She continues to have ongoing problems with bilateral knee osteoarthritis.  She has not had any falls but remains completely wheelchair-bound at this time.  She is using tylenol for pain and adding topical voltaren gel.     Fasting labs uptodate in March - CBC normal without anemia, CMP returned with stable mild alk phos elevation, improved kidney function with Cr 1.00 and GFR at 58, mild glc elevation at 103, normal potassium and slightly low chloride.    Lipid panel returned good with LDL mildly elevated at 116.    Thyroid labs returned normal    Iron sat normal with mild  elevation in ferritin - she is off her iron supplement now.     She continues to decline vaccination for COVID.     Discussed with her son DEXA and low-dose lung CT which are past due and they want to hold off at this time on health maintenance and screening.     Ongoing oncology follow-up given history of breast cancer. Continues on Femara.        CHRONIC CONDITIONS    The following portions of the patient's history were reviewed and updated as appropriate: allergies, current medications, past family history, past medical history, past social history, past surgical history, and problem list.    Outpatient Medications Prior to Visit   Medication Sig Dispense Refill    letrozole (FEMARA) 2.5 MG tablet Take 1 tablet by mouth Daily.  0    multivitamin with minerals tablet tablet Take 1 tablet by mouth Daily.      aspirin 81 MG EC tablet Take 1 tablet by mouth Daily.      furosemide (LASIX) 20 MG tablet Take 1/2 tab po daily for blood pressure 45 tablet 1    olmesartan (BENICAR) 40 MG tablet Take 1 tab po daily for blood pressure 90 tablet 1    phenytoin ER (DILANTIN) 100 MG capsule Take 3 capsules by mouth Every Night. 270 capsule 1    rosuvastatin (CRESTOR) 40 MG tablet Take 1 tablet by mouth Daily. Take 1 tab po daily for cholesterol, heart protection, and stroke prevention.  Note dose increased to 40 mg 90 tablet 1    sertraline (ZOLOFT) 50 MG tablet TAKE ONE TABLET BY MOUTH DAILY FOR MOOD 90 tablet 1     No facility-administered medications prior to visit.       Patient Active Problem List   Diagnosis    Sequela of cerebrovascular accident    Personal history of transient ischemic attack (TIA), and cerebral infarction without residual deficits    Personal history of breast cancer    Memory impairment    Depressive disorder    Anemia in chronic kidney disease    Primary osteoarthritis of left knee    General medical exam    Hypertension, essential    History of left breast cancer    Hyperlipidemia, mixed    Seizure  disorder as sequela of cerebrovascular accident    High risk medication use       Advance Care Planning:  ACP discussion was held with the patient during this visit. Patient does not have an advance directive, information provided.    Identification of Risk Factors:  Risk factors include: Advance Directive Discussion  Dementia/Memory   Fall Risk.    Review of Systems   Constitutional:  Positive for fatigue. Negative for appetite change, chills, fever and unexpected weight change.   HENT:  Negative for hearing loss.    Eyes:  Negative for visual disturbance.   Respiratory:  Negative for chest tightness, shortness of breath and wheezing.    Cardiovascular:  Negative for chest pain, palpitations and leg swelling.   Gastrointestinal:  Negative for abdominal pain.   Musculoskeletal:  Positive for arthralgias and gait problem. Negative for back pain.   Skin:  Negative for rash.   Neurological:  Positive for weakness. Negative for dizziness and headaches.   Psychiatric/Behavioral:  Positive for dysphoric mood. Negative for agitation and confusion. The patient is not nervous/anxious.      Compared to one year ago, the patient feels her physical health is better.  Compared to one year ago, the patient feels her mental health is worse.    Objective     Physical Exam  HENT:      Head: Normocephalic.      Right Ear: External ear normal.      Left Ear: External ear normal.      Nose: Nose normal.   Eyes:      Pupils: Pupils are equal, round, and reactive to light.   Cardiovascular:      Rate and Rhythm: Normal rate and regular rhythm.      Heart sounds: Normal heart sounds.   Pulmonary:      Effort: Pulmonary effort is normal.      Breath sounds: Normal breath sounds.   Musculoskeletal:      Cervical back: Normal range of motion.      Comments: Ongoing left-sided weakness after stroke.  Resting comfortably in wheelchair.  She has not walked for at least 7 years after her stroke and residual weakness with seizure disorder  "  Skin:     General: Skin is warm and dry.   Neurological:      Mental Status: She is alert and oriented to person, place, and time. Mental status is at baseline.      Motor: Weakness present.   Psychiatric:      Comments: Flareups with depression discussed.  We will go up to 100 mg of Zoloft and recheck in 1 month        Procedures     Vitals:    09/20/23 1442   BP: 128/60   BP Location: Right arm   Patient Position: Sitting   Cuff Size: Adult   Pulse: 55   SpO2: 97%   Weight: Comment: unable to obtain   Height: 170.2 cm (67\")   PainSc: 0-No pain       BMI is within normal parameters. No other follow-up for BMI required.      Lab Results   Component Value Date    WBC 7.7 03/20/2023    HGB 11.9 03/20/2023    HCT 36.8 03/20/2023    MCV 87 03/20/2023     03/20/2023     Lab Results   Component Value Date    GLUCOSE 103 (H) 03/20/2023    BUN 12 03/20/2023    CREATININE 1.00 03/20/2023    BCR 12 03/20/2023    K 3.5 03/20/2023    CO2 27 03/20/2023    CALCIUM 10.1 03/20/2023    PROTENTOTREF 7.5 03/20/2023    ALBUMIN 4.4 03/20/2023    LABIL2 1.4 03/20/2023    AST 16 03/20/2023    ALT 9 03/20/2023     Lab Results   Component Value Date    TSH 1.600 03/20/2023     No results found for: PSA  Lab Results   Component Value Date    CHLPL 199 03/20/2023    TRIG 124 03/20/2023    HDL 61 03/20/2023     (H) 03/20/2023       Assessment & Plan   Problem List Items Addressed This Visit          Cardiac and Vasculature    Hypertension, essential (Chronic)    Current Assessment & Plan     Hypertension is improving with treatment.  Continue current treatment regimen.  Blood pressure will be reassessed at the next regular appointment.         Relevant Medications    furosemide (LASIX) 20 MG tablet    olmesartan (BENICAR) 40 MG tablet    Other Relevant Orders    CBC Auto Differential    Comprehensive Metabolic Panel    Lipid Panel    Hyperlipidemia, mixed (Chronic)    Current Assessment & Plan     Lipid abnormalities are " improving with treatment.  Pharmacotherapy as ordered.  Lipids will be reassessed in 6 months.         Relevant Medications    rosuvastatin (CRESTOR) 40 MG tablet    Other Relevant Orders    CBC Auto Differential    Comprehensive Metabolic Panel    Lipid Panel       Health Encounters    General medical exam - Primary    Current Assessment & Plan     Discussed together health maintenance and screening along with vaccination options and healthy diet and exercise habits as part of the preventative counseling at their physical exam today.     Declines vaccination update.  Declines getting her mammogram up-to-date at this point of her remaining right breast.            Hematology and Neoplasia    Personal history of breast cancer (Chronic)    Overview     Formatting of this note might be different from the original.  left breast removed         Current Assessment & Plan     Ongoing follow-up with oncology         Relevant Orders    Ambulatory Referral to Home Health (Completed)    Anemia in chronic kidney disease (Chronic)    Overview     Last Assessment & Plan:   Formatting of this note might be different from the original.  hgb 9.1 at discharge, recheck this         Current Assessment & Plan     Awaiting recheck on CBC with labs today.         Relevant Medications    furosemide (LASIX) 20 MG tablet    History of left breast cancer (Chronic)    Current Assessment & Plan     Continue oncology follow-up.  They are managing her fEMARA         Relevant Medications    letrozole (FEMARA) 2.5 MG tablet       Mental Health    Depressive disorder (Chronic)    Current Assessment & Plan     Patient's depression is recurrent and is moderate without psychosis. Their depression is currently active and the condition is improving with treatment. This will be reassessed at the next regular appointment. F/U as described: Zoloft increased to 100 mg.  Recheck in 1 month .         Relevant Medications    sertraline (ZOLOFT) 100 MG tablet     Other Relevant Orders    Ambulatory Referral to Home Health (Completed)       Musculoskeletal and Injuries    Primary osteoarthritis of left knee (Chronic)    Current Assessment & Plan     Stable control with Tylenol arthritis and topical Voltaren gel.         Relevant Orders    Ambulatory Referral to Home Health (Completed)       Neuro    Sequela of cerebrovascular accident (Chronic)    Current Assessment & Plan     Wheelchair-bound given history of stroke with chronic weakness and severity of knee osteoarthritis.    Continue low-dose aspirin, Benicar, and Crestor.  Awaiting check on fasting labs         Relevant Orders    CBC Auto Differential    Comprehensive Metabolic Panel    Lipid Panel    Ambulatory Referral to Home Health (Completed)    Personal history of transient ischemic attack (TIA), and cerebral infarction without residual deficits (Chronic)    Current Assessment & Plan     Continue aspirin, Benicar, and Crestor         Memory impairment (Chronic)    Current Assessment & Plan     Stable after CVA    Mild flareups with depression discussed today.  We will go up to 100 mg of Zoloft and recheck at follow-up in 1 month         Relevant Orders    Ambulatory Referral to Home Health (Completed)    Seizure disorder as sequela of cerebrovascular accident (Chronic)    Current Assessment & Plan     We did discuss she has had some breakthrough seizures.  We will get a Dilantin level checked and at this point she does not want to see neurology         Relevant Medications    phenytoin ER (DILANTIN) 100 MG capsule    Other Relevant Orders    Phenytoin level, total    Ambulatory Referral to Home Health (Completed)     Other Visit Diagnoses       History of stroke        Relevant Medications    aspirin 81 MG EC tablet    Other Relevant Orders    Ambulatory Referral to Home Health (Completed)    Anxiety associated with depression        Relevant Medications    sertraline (ZOLOFT) 100 MG tablet    Hyperglycemia         Relevant Orders    Hemoglobin A1c    Weakness due to old stroke        Relevant Orders    Ambulatory Referral to Home Health (Completed)          Patient Self-Management and Personalized Health Advice  The patient has been provided with information about: diet, exercise, and weight management and preventive services including:   Annual Wellness Visit (AWV).    Outpatient Encounter Medications as of 9/20/2023   Medication Sig Dispense Refill    aspirin 81 MG EC tablet Take 1 tablet by mouth Daily.      furosemide (LASIX) 20 MG tablet Take 1/2 tab po daily for blood pressure 45 tablet 1    letrozole (FEMARA) 2.5 MG tablet Take 1 tablet by mouth Daily.  0    multivitamin with minerals tablet tablet Take 1 tablet by mouth Daily.      olmesartan (BENICAR) 40 MG tablet Take 1 tab po daily for blood pressure 90 tablet 1    phenytoin ER (DILANTIN) 100 MG capsule Take 3 capsules by mouth Every Night. 270 capsule 1    rosuvastatin (CRESTOR) 40 MG tablet Take 1 tablet by mouth Daily. Take 1 tab po daily for cholesterol, heart protection, and stroke prevention. 90 tablet 1    sertraline (ZOLOFT) 100 MG tablet TAKE ONE TABLET BY MOUTH DAILY FOR MOOD 90 tablet 1    [DISCONTINUED] aspirin 81 MG EC tablet Take 1 tablet by mouth Daily.      [DISCONTINUED] furosemide (LASIX) 20 MG tablet Take 1/2 tab po daily for blood pressure 45 tablet 1    [DISCONTINUED] olmesartan (BENICAR) 40 MG tablet Take 1 tab po daily for blood pressure 90 tablet 1    [DISCONTINUED] phenytoin ER (DILANTIN) 100 MG capsule Take 3 capsules by mouth Every Night. 270 capsule 1    [DISCONTINUED] rosuvastatin (CRESTOR) 40 MG tablet Take 1 tablet by mouth Daily. Take 1 tab po daily for cholesterol, heart protection, and stroke prevention.  Note dose increased to 40 mg 90 tablet 1    [DISCONTINUED] sertraline (ZOLOFT) 50 MG tablet TAKE ONE TABLET BY MOUTH DAILY FOR MOOD 90 tablet 1    [DISCONTINUED] sertraline (ZOLOFT) 50 MG tablet TAKE ONE TABLET BY MOUTH DAILY FOR  MOOD 90 tablet 1     No facility-administered encounter medications on file as of 9/20/2023.       Reviewed use of high risk medication in the elderly: yes  Reviewed for potential of harmful drug interactions in the elderly: yes  Diagnoses and all orders for this visit:    1. General medical exam (Primary)  Assessment & Plan:  Discussed together health maintenance and screening along with vaccination options and healthy diet and exercise habits as part of the preventative counseling at their physical exam today.     Declines vaccination update.  Declines getting her mammogram up-to-date at this point of her remaining right breast.      2. Seizure disorder as sequela of cerebrovascular accident  Assessment & Plan:  We did discuss she has had some breakthrough seizures.  We will get a Dilantin level checked and at this point she does not want to see neurology    Orders:  -     phenytoin ER (DILANTIN) 100 MG capsule; Take 3 capsules by mouth Every Night.  Dispense: 270 capsule; Refill: 1  -     Phenytoin level, total; Future  -     Ambulatory Referral to Home Health    3. Sequela of cerebrovascular accident  Assessment & Plan:  Wheelchair-bound given history of stroke with chronic weakness and severity of knee osteoarthritis.    Continue low-dose aspirin, Benicar, and Crestor.  Awaiting check on fasting labs    Orders:  -     CBC Auto Differential; Future  -     Comprehensive Metabolic Panel; Future  -     Lipid Panel; Future  -     Ambulatory Referral to Home Health    4. Personal history of transient ischemic attack (TIA), and cerebral infarction without residual deficits  Assessment & Plan:  Continue aspirin, Benicar, and Crestor      5. Personal history of breast cancer  Assessment & Plan:  Ongoing follow-up with oncology    Orders:  -     Ambulatory Referral to Home Health    6. Memory impairment  Assessment & Plan:  Stable after CVA    Mild flareups with depression discussed today.  We will go up to 100 mg of  Zoloft and recheck at follow-up in 1 month    Orders:  -     Ambulatory Referral to Home Health    7. Depressive disorder  Assessment & Plan:  Patient's depression is recurrent and is moderate without psychosis. Their depression is currently active and the condition is improving with treatment. This will be reassessed at the next regular appointment. F/U as described: Zoloft increased to 100 mg.  Recheck in 1 month .    Orders:  -     Discontinue: sertraline (ZOLOFT) 50 MG tablet; TAKE ONE TABLET BY MOUTH DAILY FOR MOOD  Dispense: 90 tablet; Refill: 1  -     sertraline (ZOLOFT) 100 MG tablet; TAKE ONE TABLET BY MOUTH DAILY FOR MOOD  Dispense: 90 tablet; Refill: 1  -     Ambulatory Referral to Home Health    8. Anemia in stage 3a chronic kidney disease  Assessment & Plan:  Awaiting recheck on CBC with labs today.      9. Primary osteoarthritis of left knee  Assessment & Plan:  Stable control with Tylenol arthritis and topical Voltaren gel.    Orders:  -     Ambulatory Referral to Home Health    10. Hypertension, essential  Assessment & Plan:  Hypertension is improving with treatment.  Continue current treatment regimen.  Blood pressure will be reassessed at the next regular appointment.    Orders:  -     furosemide (LASIX) 20 MG tablet; Take 1/2 tab po daily for blood pressure  Dispense: 45 tablet; Refill: 1  -     olmesartan (BENICAR) 40 MG tablet; Take 1 tab po daily for blood pressure  Dispense: 90 tablet; Refill: 1  -     CBC Auto Differential; Future  -     Comprehensive Metabolic Panel; Future  -     Lipid Panel; Future    11. History of left breast cancer  Assessment & Plan:  Continue oncology follow-up.  They are managing her fEMARA      12. Hyperlipidemia, mixed  Assessment & Plan:  Lipid abnormalities are improving with treatment.  Pharmacotherapy as ordered.  Lipids will be reassessed in 6 months.    Orders:  -     rosuvastatin (CRESTOR) 40 MG tablet; Take 1 tablet by mouth Daily. Take 1 tab po daily for  cholesterol, heart protection, and stroke prevention.  Dispense: 90 tablet; Refill: 1  -     CBC Auto Differential; Future  -     Comprehensive Metabolic Panel; Future  -     Lipid Panel; Future    13. History of stroke  -     aspirin 81 MG EC tablet; Take 1 tablet by mouth Daily.  -     Ambulatory Referral to Home Health    14. Anxiety associated with depression  -     Discontinue: sertraline (ZOLOFT) 50 MG tablet; TAKE ONE TABLET BY MOUTH DAILY FOR MOOD  Dispense: 90 tablet; Refill: 1  -     sertraline (ZOLOFT) 100 MG tablet; TAKE ONE TABLET BY MOUTH DAILY FOR MOOD  Dispense: 90 tablet; Refill: 1    15. Hyperglycemia  -     Hemoglobin A1c; Future    16. Weakness due to old stroke  -     Ambulatory Referral to Home Health          Follow Up:  Return in about 4 weeks (around 10/18/2023) for Med recheck.     There are no Patient Instructions on file for this visit.    An After Visit Summary and PPPS with all of these plans were given to the patient.

## 2023-09-25 ENCOUNTER — TELEPHONE (OUTPATIENT)
Dept: FAMILY MEDICINE CLINIC | Facility: CLINIC | Age: 78
End: 2023-09-25

## 2023-09-25 NOTE — TELEPHONE ENCOUNTER
Caller: NORA    Relationship: Home Health    Best call back number: [phone]    What orders are you requesting (i.e. lab or imaging): ADD TO EXISTING HOME HEALTH-(PHYSICAL THERAPY-EVAL)  (OCCUPATIONAL THERAPY-EVAL) (MEDICAL SOCIAL WORKER)     In what timeframe would the patient need to come in: N/A    Where will you receive your lab/imaging services: [facility]     Additional notes: NORA STATED THAT SHE WOULD LIKE TO ADD TO CURRENT HOME HEALTH ORDERS THE ABOVE FOR PATIENT AND MEDICAL SOCIAL WORKER ORDER FOR OUTSIDE RESOURCES THAT PATIENT IS ELIGIBLE FOR

## 2023-09-26 ENCOUNTER — TELEPHONE (OUTPATIENT)
Dept: FAMILY MEDICINE CLINIC | Facility: CLINIC | Age: 78
End: 2023-09-26
Payer: MEDICARE

## 2023-10-03 ENCOUNTER — TELEPHONE (OUTPATIENT)
Dept: FAMILY MEDICINE CLINIC | Facility: CLINIC | Age: 78
End: 2023-10-03
Payer: MEDICARE

## 2023-10-19 ENCOUNTER — OFFICE VISIT (OUTPATIENT)
Dept: FAMILY MEDICINE CLINIC | Facility: CLINIC | Age: 78
End: 2023-10-19
Payer: MEDICARE

## 2023-10-19 VITALS
HEART RATE: 62 BPM | OXYGEN SATURATION: 98 % | DIASTOLIC BLOOD PRESSURE: 68 MMHG | HEIGHT: 67 IN | SYSTOLIC BLOOD PRESSURE: 116 MMHG | BODY MASS INDEX: 23.49 KG/M2

## 2023-10-19 DIAGNOSIS — I10 HYPERTENSION, ESSENTIAL: Chronic | ICD-10-CM

## 2023-10-19 DIAGNOSIS — N18.31 ANEMIA IN STAGE 3A CHRONIC KIDNEY DISEASE: Chronic | ICD-10-CM

## 2023-10-19 DIAGNOSIS — E78.2 HYPERLIPIDEMIA, MIXED: Chronic | ICD-10-CM

## 2023-10-19 DIAGNOSIS — F32.A DEPRESSIVE DISORDER: Primary | Chronic | ICD-10-CM

## 2023-10-19 DIAGNOSIS — D63.1 ANEMIA IN STAGE 3A CHRONIC KIDNEY DISEASE: Chronic | ICD-10-CM

## 2023-10-19 DIAGNOSIS — R41.3 MEMORY IMPAIRMENT: Chronic | ICD-10-CM

## 2023-10-19 NOTE — ASSESSMENT & PLAN NOTE
Improved with Zoloft dose adjustment last visit.  We will continue to follow and monitor.  Encouraged her to get in to get lab work up-to-date including Dilantin level

## 2023-10-19 NOTE — PROGRESS NOTES
"Chief Complaint  Med Refill    Subjective    History of Present Illness:  Sirena Chong is a 78 y.o. female who presents today for follow-up visit after adjustment with her Zoloft dosing at her last visit in September.    She has done much better with mild depression and anxiety symptoms after adjustment with Zoloft.  This has helped a little bit with her cognitive decline as well.    Doing well with current regimen for hypertension, hyperlipidemia, and history of seizures after prior stroke.    She is under 24 Hour care from her son and he does need [facility] paperwork completed today.    He is able to work currently at [place] [employer] from [employer] and is able to return home after this shift and help care for his mother throughout the day.  We will get his [facility] paperwork up-to-date and he also does take her to all of her doctors visits.    She still has problems with toenail care and they plan to get in with podiatry locally in [place] to get her nails trimmed    Objective   Vital Signs:   /68   Pulse 62   Ht 170.2 cm (67\")   SpO2 98%   BMI 23.49 kg/m²     Review of Systems   Constitutional:  Negative for appetite change, chills and fever.   HENT:  Negative for hearing loss.    Eyes:  Negative for blurred vision.   Respiratory:  Negative for chest tightness.    Cardiovascular:  Negative for chest pain.   Gastrointestinal:  Negative for abdominal pain.   Musculoskeletal:  Positive for gait problem.   Skin:  Negative for rash.   Neurological:  Positive for weakness. Negative for seizures.   Psychiatric/Behavioral:  Negative for depressed mood.        Past History:  Medical History: has a past medical history of Alcohol abuse, Anemia, iron deficiency, Aspiration pneumonia, Breast cancer, left (2017), Chronic vertigo, CVA (cerebral vascular accident) (02/2016), Depression, Diabetes, Hypertension, Nephrolithiasis, Seizure (02/2016), Tobacco abuse, and UTI (urinary tract infection).   Surgical History: has a past surgical " history that includes Tonsillectomy and Appendectomy.   Family History: family history includes Diabetes in her mother; Hypertension in her mother; Stroke in her mother.   Social History: reports that she quit smoking about 7 years ago. Her smoking use included cigarettes. She started smoking about 62 years ago. She has a 25.00 pack-year smoking history. She has never used smokeless tobacco. She reports that she does not currently use alcohol. She reports that she does not use drugs.      Current Outpatient Medications:     aspirin 81 MG EC tablet, Take 1 tablet by mouth Daily., Disp: , Rfl:     furosemide (LASIX) 20 MG tablet, Take 1/2 tab po daily for blood pressure, Disp: 45 tablet, Rfl: 1    letrozole (FEMARA) 2.5 MG tablet, Take 1 tablet by mouth Daily., Disp: , Rfl: 0    multivitamin with minerals tablet tablet, Take 1 tablet by mouth Daily., Disp: , Rfl:     olmesartan (BENICAR) 40 MG tablet, Take 1 tab po daily for blood pressure, Disp: 90 tablet, Rfl: 1    phenytoin ER (DILANTIN) 100 MG capsule, Take 3 capsules by mouth Every Night., Disp: 270 capsule, Rfl: 1    rosuvastatin (CRESTOR) 40 MG tablet, Take 1 tablet by mouth Daily. Take 1 tab po daily for cholesterol, heart protection, and stroke prevention., Disp: 90 tablet, Rfl: 1    sertraline (ZOLOFT) 100 MG tablet, TAKE ONE TABLET BY MOUTH DAILY FOR MOOD, Disp: 90 tablet, Rfl: 1    Allergies: Patient has no known allergies.    Physical Exam  HENT:      Head: Normocephalic.      Right Ear: External ear normal.      Left Ear: External ear normal.      Nose: Nose normal.   Eyes:      Pupils: Pupils are equal, round, and reactive to light.   Cardiovascular:      Rate and Rhythm: Normal rate and regular rhythm.      Heart sounds: Normal heart sounds.   Pulmonary:      Effort: Pulmonary effort is normal.      Breath sounds: Normal breath sounds.   Musculoskeletal:      Cervical back: Normal range of motion.      Comments: Resting comfortably in wheelchair.   Ongoing problems with hemiplegia after her stroke   Skin:     General: Skin is warm and dry.   Neurological:      Mental Status: She is alert and oriented to person, place, and time. Mental status is at baseline.   Psychiatric:         Mood and Affect: Mood normal.         Behavior: Behavior normal.         Thought Content: Thought content normal.          Result Review                   Assessment and Plan  Diagnoses and all orders for this visit:    1. Depressive disorder (Primary)  Assessment & Plan:  Patient's depression is recurrent and is mild without psychosis. Their depression is currently in partial remission and the condition is improving with treatment. This will be reassessed at the next regular appointment. F/U as described:patient will continue current medication therapy.      2. Memory impairment  Assessment & Plan:  Improved with Zoloft dose adjustment last visit.  We will continue to follow and monitor.  Encouraged her to get in to get lab work up-to-date including Dilantin level      3. Anemia in stage 3a chronic kidney disease  Assessment & Plan:  Awaiting recheck on CBC with labs today.      4. Hypertension, essential  Assessment & Plan:  Hypertension is improving with treatment.  Continue current treatment regimen.  Blood pressure will be reassessed at the next regular appointment.      5. Hyperlipidemia, mixed  Assessment & Plan:  Lipid abnormalities are improving with treatment.  Pharmacotherapy as ordered.  Lipids will be reassessed in 6 months.          BMI is within normal parameters. No other follow-up for BMI required.          Follow Up  Return in about 5 months (around 3/19/2024) for Med recheck.    Patrick Cotto MD

## 2023-10-30 ENCOUNTER — OUTSIDE FACILITY SERVICE (OUTPATIENT)
Dept: FAMILY MEDICINE CLINIC | Facility: CLINIC | Age: 78
End: 2023-10-30
Payer: MEDICARE

## 2023-11-01 ENCOUNTER — OUTSIDE FACILITY SERVICE (OUTPATIENT)
Dept: FAMILY MEDICINE CLINIC | Facility: CLINIC | Age: 78
End: 2023-11-01
Payer: MEDICARE

## 2024-03-19 ENCOUNTER — OFFICE VISIT (OUTPATIENT)
Dept: FAMILY MEDICINE CLINIC | Facility: CLINIC | Age: 79
End: 2024-03-19
Payer: MEDICARE

## 2024-03-19 VITALS — HEART RATE: 52 BPM | OXYGEN SATURATION: 100 % | DIASTOLIC BLOOD PRESSURE: 74 MMHG | SYSTOLIC BLOOD PRESSURE: 122 MMHG

## 2024-03-19 DIAGNOSIS — R73.9 HYPERGLYCEMIA: ICD-10-CM

## 2024-03-19 DIAGNOSIS — F32.A DEPRESSIVE DISORDER: Chronic | ICD-10-CM

## 2024-03-19 DIAGNOSIS — Z86.73 HISTORY OF STROKE: ICD-10-CM

## 2024-03-19 DIAGNOSIS — E78.2 HYPERLIPIDEMIA, MIXED: Chronic | ICD-10-CM

## 2024-03-19 DIAGNOSIS — I10 HYPERTENSION, ESSENTIAL: Primary | Chronic | ICD-10-CM

## 2024-03-19 DIAGNOSIS — I69.30 SEQUELA OF CEREBROVASCULAR ACCIDENT: ICD-10-CM

## 2024-03-19 DIAGNOSIS — Z85.3 HISTORY OF LEFT BREAST CANCER: Chronic | ICD-10-CM

## 2024-03-19 DIAGNOSIS — I69.998 WEAKNESS DUE TO OLD STROKE: ICD-10-CM

## 2024-03-19 DIAGNOSIS — M17.12 PRIMARY OSTEOARTHRITIS OF LEFT KNEE: Chronic | ICD-10-CM

## 2024-03-19 DIAGNOSIS — I69.398 SEIZURE DISORDER AS SEQUELA OF CEREBROVASCULAR ACCIDENT: Chronic | ICD-10-CM

## 2024-03-19 DIAGNOSIS — G40.909 SEIZURE DISORDER AS SEQUELA OF CEREBROVASCULAR ACCIDENT: Chronic | ICD-10-CM

## 2024-03-19 DIAGNOSIS — R41.3 MEMORY IMPAIRMENT: Chronic | ICD-10-CM

## 2024-03-19 DIAGNOSIS — R53.1 WEAKNESS DUE TO OLD STROKE: ICD-10-CM

## 2024-03-19 RX ORDER — SERTRALINE HYDROCHLORIDE 100 MG/1
TABLET, FILM COATED ORAL
Qty: 90 TABLET | Refills: 1 | Status: SHIPPED | OUTPATIENT
Start: 2024-03-19

## 2024-03-19 RX ORDER — FUROSEMIDE 20 MG/1
TABLET ORAL
Qty: 45 TABLET | Refills: 1 | Status: SHIPPED | OUTPATIENT
Start: 2024-03-19

## 2024-03-19 RX ORDER — ROSUVASTATIN CALCIUM 40 MG/1
40 TABLET, COATED ORAL DAILY
Qty: 90 TABLET | Refills: 1 | Status: SHIPPED | OUTPATIENT
Start: 2024-03-19

## 2024-03-19 RX ORDER — PHENYTOIN SODIUM 100 MG/1
300 CAPSULE, EXTENDED RELEASE ORAL NIGHTLY
Qty: 270 CAPSULE | Refills: 1 | Status: SHIPPED | OUTPATIENT
Start: 2024-03-19

## 2024-03-19 RX ORDER — ASPIRIN 81 MG/1
81 TABLET ORAL DAILY
Start: 2024-03-19

## 2024-03-19 RX ORDER — OLMESARTAN MEDOXOMIL 40 MG/1
TABLET ORAL
Qty: 90 TABLET | Refills: 1 | Status: SHIPPED | OUTPATIENT
Start: 2024-03-19

## 2024-03-19 NOTE — ASSESSMENT & PLAN NOTE
Improved with Zoloft dose adjustment last year.  We will continue to follow and monitor.  Encouraged her to get in to get lab work up-to-date including Dilantin level

## 2024-03-19 NOTE — ASSESSMENT & PLAN NOTE
We did discuss she has had some breakthrough seizures but not many - will attempt again to get a Dilantin level checked and at this point she does not want to see neurology

## 2024-03-19 NOTE — PROGRESS NOTES
Chief Complaint  History of CVA w/ L sided weakness and sz after CVA, HTN, Hyperlipidemia, Depression, history of breast cancer.    Subjective    History of Present Illness:  Sirena Chong is a 78 y.o. female who presents today for 6 month followup visit and medication refills.     Doing well since our last visit together in Sept 2023 for AWV     She continues to have ongoing problems with bilateral knee osteoarthritis.  She has not had any falls but remains completely wheelchair-bound at this time.  She is using tylenol for pain and adding topical voltaren gel.     Fasting labs uptodate in March 2023 - willing to try for labs today but tends to be a very hard stick    Last labs reviewed: CBC normal without anemia, CMP returned with stable mild alk phos elevation, improved kidney function with Cr 1.00 and GFR at 58, mild glc elevation at 103, normal potassium and slightly low chloride.     Lipid panel returned good with LDL mildly elevated at 116.     Thyroid labs returned normal     Iron sat normal with mild elevation in ferritin - she is off her iron supplement      She continues to decline vaccination for COVID.     Discussed with her son DEXA and low-dose lung CT which are past due and they want to hold off at this time on health maintenance and screening.     Ongoing oncology follow-up given history of breast cancer. Continues on Femara.    Objective   Vital Signs:   /74   Pulse 52   SpO2 100%     Review of Systems   Constitutional:  Negative for appetite change, chills and fever.   HENT:  Negative for hearing loss.    Eyes:  Negative for blurred vision.   Respiratory:  Negative for chest tightness.    Cardiovascular:  Negative for chest pain.   Gastrointestinal:  Negative for abdominal pain.   Musculoskeletal:  Positive for arthralgias and gait problem.   Skin:  Negative for rash.   Neurological:  Positive for seizures and weakness.   Psychiatric/Behavioral:  Negative for depressed mood.        Past  History:  Medical History: has a past medical history of Alcohol abuse, Anemia, iron deficiency, Aspiration pneumonia, Breast cancer, left (2017), Chronic vertigo, CVA (cerebral vascular accident) (02/2016), Depression, Diabetes, Hypertension, Nephrolithiasis, Seizure (02/2016), Tobacco abuse, and UTI (urinary tract infection).   Surgical History: has a past surgical history that includes Tonsillectomy and Appendectomy.   Family History: family history includes Diabetes in her mother; Hypertension in her mother; Stroke in her mother.   Social History: reports that she quit smoking about 8 years ago. Her smoking use included cigarettes. She started smoking about 63 years ago. She has a 55 pack-year smoking history. She has never used smokeless tobacco. She reports that she does not currently use alcohol. She reports that she does not use drugs.      Current Outpatient Medications:     aspirin 81 MG EC tablet, Take 1 tablet by mouth Daily., Disp: , Rfl:     furosemide (LASIX) 20 MG tablet, Take 1/2 tab po daily for blood pressure, Disp: 45 tablet, Rfl: 1    letrozole (FEMARA) 2.5 MG tablet, Take 1 tablet by mouth Daily., Disp: , Rfl: 0    multivitamin with minerals tablet tablet, Take 1 tablet by mouth Daily., Disp: , Rfl:     olmesartan (BENICAR) 40 MG tablet, Take 1 tab po daily for blood pressure, Disp: 90 tablet, Rfl: 1    phenytoin ER (DILANTIN) 100 MG capsule, Take 3 capsules by mouth Every Night., Disp: 270 capsule, Rfl: 1    rosuvastatin (CRESTOR) 40 MG tablet, Take 1 tablet by mouth Daily. Take 1 tab po daily for cholesterol, heart protection, and stroke prevention., Disp: 90 tablet, Rfl: 1    sertraline (ZOLOFT) 100 MG tablet, TAKE ONE TABLET BY MOUTH DAILY FOR MOOD, Disp: 90 tablet, Rfl: 1    Allergies: Patient has no known allergies.    Physical Exam  HENT:      Head: Normocephalic.      Right Ear: External ear normal.      Left Ear: External ear normal.      Nose: Nose normal.   Eyes:      Pupils: Pupils  are equal, round, and reactive to light.   Cardiovascular:      Rate and Rhythm: Normal rate and regular rhythm.      Heart sounds: Normal heart sounds.   Pulmonary:      Effort: Pulmonary effort is normal.      Breath sounds: Normal breath sounds.   Musculoskeletal:      Cervical back: Normal range of motion.   Skin:     General: Skin is warm and dry.   Neurological:      Mental Status: She is alert and oriented to person, place, and time. Mental status is at baseline.      Motor: Weakness present.      Gait: Gait abnormal.      Comments: Resting in wheelchair   Psychiatric:         Mood and Affect: Mood normal.         Behavior: Behavior normal.         Thought Content: Thought content normal.          Result Review                   Assessment and Plan  Diagnoses and all orders for this visit:    1. Hypertension, essential (Primary)  Assessment & Plan:  Hypertension is improving with treatment.  Continue current treatment regimen.  Blood pressure will be reassessed at the next regular appointment.    Orders:  -     furosemide (LASIX) 20 MG tablet; Take 1/2 tab po daily for blood pressure  Dispense: 45 tablet; Refill: 1  -     olmesartan (BENICAR) 40 MG tablet; Take 1 tab po daily for blood pressure  Dispense: 90 tablet; Refill: 1  -     CBC Auto Differential; Future  -     Comprehensive Metabolic Panel; Future  -     Lipid Panel; Future  -     TSH; Future  -     T4, Free; Future  -     CBC Auto Differential  -     Comprehensive Metabolic Panel  -     Lipid Panel  -     TSH  -     T4, Free    2. History of stroke  -     aspirin 81 MG EC tablet; Take 1 tablet by mouth Daily.  -     Ambulatory Referral to Home Health    3. Seizure disorder as sequela of cerebrovascular accident  Assessment & Plan:  We did discuss she has had some breakthrough seizures but not many - will attempt again to get a Dilantin level checked and at this point she does not want to see neurology    Orders:  -     phenytoin ER (DILANTIN) 100 MG  capsule; Take 3 capsules by mouth Every Night.  Dispense: 270 capsule; Refill: 1  -     Folate; Future  -     Phenytoin level, total; Future  -     Ambulatory Referral to Home Health  -     Folate  -     Phenytoin level, total    4. Hyperlipidemia, mixed  Assessment & Plan:   Lipid abnormalities are improving with treatment    Plan:  Continue same medication/s without change.      Discussed medication dosage, use, side effects, and goals of treatment in detail.    Counseled patient on lifestyle modifications to help control hyperlipidemia.     Patient Treatment Goals:   LDL goal is under 100    Followup at the next regular appointment.    Orders:  -     rosuvastatin (CRESTOR) 40 MG tablet; Take 1 tablet by mouth Daily. Take 1 tab po daily for cholesterol, heart protection, and stroke prevention.  Dispense: 90 tablet; Refill: 1  -     CBC Auto Differential; Future  -     Comprehensive Metabolic Panel; Future  -     Lipid Panel; Future  -     TSH; Future  -     T4, Free; Future  -     Phenytoin level, total; Future  -     CBC Auto Differential  -     Comprehensive Metabolic Panel  -     Lipid Panel  -     TSH  -     T4, Free  -     Phenytoin level, total    5. Depressive disorder  Assessment & Plan:  Patient's depression is recurrent and is mild without psychosis. Their depression is currently in partial remission and the condition is improving with treatment. This will be reassessed at the next regular appointment. F/U as described:patient will continue current medication therapy.    Orders:  -     sertraline (ZOLOFT) 100 MG tablet; TAKE ONE TABLET BY MOUTH DAILY FOR MOOD  Dispense: 90 tablet; Refill: 1    6. Hyperglycemia  -     Hemoglobin A1c; Future  -     Hemoglobin A1c    7. Weakness due to old stroke  Assessment & Plan:  Cont use of wheelchair    Order to restart home health PT/OT and help with nursing care    Orders:  -     Ambulatory Referral to Home Health    8. Sequela of cerebrovascular  accident  Assessment & Plan:  Wheelchair-bound given history of stroke with chronic weakness and severity of knee osteoarthritis.    Continue low-dose aspirin, Benicar, and Crestor.  Awaiting check on fasting labs    Orders:  -     Ambulatory Referral to Home Health    9. Primary osteoarthritis of left knee  Assessment & Plan:  Stable control with Tylenol arthritis and topical Voltaren gel.      10. Memory impairment  Assessment & Plan:  Improved with Zoloft dose adjustment last year.  We will continue to follow and monitor.  Encouraged her to get in to get lab work up-to-date including Dilantin level      11. History of left breast cancer  Assessment & Plan:  Continue oncology follow-up.  They are managing her fEMARA          BMI is within normal parameters. No other follow-up for BMI required.          Follow Up  Return in 6 months (on 9/23/2024) for Medicare Wellness, Fasting for labs at appointment (but drink water!).    Patrick Cotto MD

## 2024-03-20 LAB
ALBUMIN SERPL-MCNC: 4.1 G/DL (ref 3.8–4.8)
ALBUMIN/GLOB SERPL: 1.3 {RATIO} (ref 1.2–2.2)
ALP SERPL-CCNC: 166 IU/L (ref 44–121)
ALT SERPL-CCNC: 15 IU/L (ref 0–32)
AST SERPL-CCNC: 19 IU/L (ref 0–40)
BILIRUB SERPL-MCNC: <0.2 MG/DL (ref 0–1.2)
BUN SERPL-MCNC: 21 MG/DL (ref 8–27)
BUN/CREAT SERPL: 19 (ref 12–28)
CALCIUM SERPL-MCNC: 9.6 MG/DL (ref 8.7–10.3)
CHLORIDE SERPL-SCNC: 106 MMOL/L (ref 96–106)
CHOLEST SERPL-MCNC: 180 MG/DL (ref 100–199)
CO2 SERPL-SCNC: 17 MMOL/L (ref 20–29)
CREAT SERPL-MCNC: 1.13 MG/DL (ref 0.57–1)
EGFRCR SERPLBLD CKD-EPI 2021: 50 ML/MIN/1.73
FOLATE SERPL-MCNC: 16.4 NG/ML
GLOBULIN SER CALC-MCNC: 3.2 G/DL (ref 1.5–4.5)
GLUCOSE SERPL-MCNC: ABNORMAL MG/DL
HDLC SERPL-MCNC: 64 MG/DL
LDLC SERPL CALC-MCNC: 98 MG/DL (ref 0–99)
POTASSIUM SERPL-SCNC: ABNORMAL MMOL/L
PROT SERPL-MCNC: 7.3 G/DL (ref 6–8.5)
SODIUM SERPL-SCNC: 144 MMOL/L (ref 134–144)
T4 FREE SERPL-MCNC: 0.87 NG/DL (ref 0.82–1.77)
TRIGL SERPL-MCNC: 100 MG/DL (ref 0–149)
TSH SERPL DL<=0.005 MIU/L-ACNC: 1.47 UIU/ML (ref 0.45–4.5)
VLDLC SERPL CALC-MCNC: 18 MG/DL (ref 5–40)

## 2024-03-20 NOTE — PROGRESS NOTES
THE MESSAGE BELOW IS ABLE TO BE GIVEN BY THE HUB.  THE HUB MAY SCHEDULE A FOLLOW-UP VISIT FOR THE PATIENT IF INDICATED IN THE MESSAGE BELOW...    Please call patient and/or patient's son with recent lab work results:    Her metabolic panel returned with slightly lower kidney function compared to past labs and we will plan to recheck this with her future visit.  Please make sure that she works on hydration given we had such a difficult time getting her blood work done yesterday.  There was an issue with the sample and they were unable to run the glucose or potassium and we will just recheck this with future labs.    Her AST and ALT liver enzymes returned normal and her alkaline phosphatase was mildly elevated which is likely related to her treatment with Dilantin for seizures.  Will continue to monitor this with future labs but it is not at a concerning level at this time.    Her cholesterol control return good.    We are still waiting on the remainder of her labs to return and we will contact them with those results when they become available

## 2024-03-21 NOTE — PROGRESS NOTES
THE MESSAGE BELOW IS ABLE TO BE GIVEN BY THE HUB.  THE HUB MAY SCHEDULE A FOLLOW-UP VISIT FOR THE PATIENT IF INDICATED IN THE MESSAGE BELOW...      Please call patient with the remainder of her lab results:    Her folic acid level returned normal.    Her thyroid function blood work returned normal.

## 2024-03-26 ENCOUNTER — TELEPHONE (OUTPATIENT)
Dept: FAMILY MEDICINE CLINIC | Facility: CLINIC | Age: 79
End: 2024-03-26
Payer: MEDICARE

## 2024-03-26 NOTE — TELEPHONE ENCOUNTER
[facility]  needs us call to verbal orders for this patient. If we can call her back at [phone]

## 2024-07-10 ENCOUNTER — TELEPHONE (OUTPATIENT)
Dept: FAMILY MEDICINE CLINIC | Facility: CLINIC | Age: 79
End: 2024-07-10
Payer: MEDICARE

## 2024-07-10 NOTE — TELEPHONE ENCOUNTER
Caller: KAYLAN XAVIER    Relationship to patient: Other    Best call back number: [phone]    KAYLAN CRANECHANEL CALLED WITH SEVERAL MEDICAL QUESTIONS THAT SHE NEEDS VERIFIED.    REF #  [id number]  PLEASE ADVISE.

## 2024-07-10 NOTE — TELEPHONE ENCOUNTER
Spoke with [facility] could barely understand what was being said she was calling to confirm pt has cardio vascular disease I let her know that's not on pts problem list.

## 2024-07-22 ENCOUNTER — TELEPHONE (OUTPATIENT)
Dept: FAMILY MEDICINE CLINIC | Facility: CLINIC | Age: 79
End: 2024-07-22
Payer: MEDICARE

## 2024-07-22 DIAGNOSIS — Z86.73 HISTORY OF STROKE: Primary | ICD-10-CM

## 2024-07-22 DIAGNOSIS — R53.1 WEAKNESS DUE TO OLD STROKE: ICD-10-CM

## 2024-07-22 DIAGNOSIS — I69.998 WEAKNESS DUE TO OLD STROKE: ICD-10-CM

## 2024-07-22 NOTE — TELEPHONE ENCOUNTER
Patients son called and asked if Dr. Cotto could send in an order to [facility] for his mother to received services.

## 2024-09-23 ENCOUNTER — OFFICE VISIT (OUTPATIENT)
Dept: FAMILY MEDICINE CLINIC | Facility: CLINIC | Age: 79
End: 2024-09-23
Payer: MEDICARE

## 2024-09-23 VITALS
TEMPERATURE: 98.1 F | DIASTOLIC BLOOD PRESSURE: 80 MMHG | HEIGHT: 67 IN | BODY MASS INDEX: 23.49 KG/M2 | SYSTOLIC BLOOD PRESSURE: 128 MMHG | HEART RATE: 86 BPM | OXYGEN SATURATION: 96 % | RESPIRATION RATE: 20 BRPM

## 2024-09-23 DIAGNOSIS — R41.3 MEMORY IMPAIRMENT: ICD-10-CM

## 2024-09-23 DIAGNOSIS — I10 HYPERTENSION, ESSENTIAL: Chronic | ICD-10-CM

## 2024-09-23 DIAGNOSIS — G40.909 SEIZURE DISORDER AS SEQUELA OF CEREBROVASCULAR ACCIDENT: Chronic | ICD-10-CM

## 2024-09-23 DIAGNOSIS — N18.31 ANEMIA IN STAGE 3A CHRONIC KIDNEY DISEASE: ICD-10-CM

## 2024-09-23 DIAGNOSIS — Z85.3 HISTORY OF LEFT BREAST CANCER: ICD-10-CM

## 2024-09-23 DIAGNOSIS — I69.30 SEQUELA OF CEREBROVASCULAR ACCIDENT: ICD-10-CM

## 2024-09-23 DIAGNOSIS — E78.2 HYPERLIPIDEMIA, MIXED: Chronic | ICD-10-CM

## 2024-09-23 DIAGNOSIS — I69.998 WEAKNESS DUE TO OLD STROKE: ICD-10-CM

## 2024-09-23 DIAGNOSIS — R53.1 WEAKNESS DUE TO OLD STROKE: ICD-10-CM

## 2024-09-23 DIAGNOSIS — Z86.73 HISTORY OF STROKE: ICD-10-CM

## 2024-09-23 DIAGNOSIS — M17.12 PRIMARY OSTEOARTHRITIS OF LEFT KNEE: ICD-10-CM

## 2024-09-23 DIAGNOSIS — F32.A DEPRESSIVE DISORDER: Chronic | ICD-10-CM

## 2024-09-23 DIAGNOSIS — D63.1 ANEMIA IN STAGE 3A CHRONIC KIDNEY DISEASE: ICD-10-CM

## 2024-09-23 DIAGNOSIS — Z00.00 GENERAL MEDICAL EXAM: Primary | ICD-10-CM

## 2024-09-23 DIAGNOSIS — I69.398 SEIZURE DISORDER AS SEQUELA OF CEREBROVASCULAR ACCIDENT: Chronic | ICD-10-CM

## 2024-09-23 PROCEDURE — 1160F RVW MEDS BY RX/DR IN RCRD: CPT | Performed by: FAMILY MEDICINE

## 2024-09-23 PROCEDURE — 96160 PT-FOCUSED HLTH RISK ASSMT: CPT | Performed by: FAMILY MEDICINE

## 2024-09-23 PROCEDURE — 3079F DIAST BP 80-89 MM HG: CPT | Performed by: FAMILY MEDICINE

## 2024-09-23 PROCEDURE — 1170F FXNL STATUS ASSESSED: CPT | Performed by: FAMILY MEDICINE

## 2024-09-23 PROCEDURE — 1159F MED LIST DOCD IN RCRD: CPT | Performed by: FAMILY MEDICINE

## 2024-09-23 PROCEDURE — 1126F AMNT PAIN NOTED NONE PRSNT: CPT | Performed by: FAMILY MEDICINE

## 2024-09-23 PROCEDURE — G0439 PPPS, SUBSEQ VISIT: HCPCS | Performed by: FAMILY MEDICINE

## 2024-09-23 PROCEDURE — 99214 OFFICE O/P EST MOD 30 MIN: CPT | Performed by: FAMILY MEDICINE

## 2024-09-23 PROCEDURE — 3074F SYST BP LT 130 MM HG: CPT | Performed by: FAMILY MEDICINE

## 2024-09-23 RX ORDER — SERTRALINE HYDROCHLORIDE 100 MG/1
TABLET, FILM COATED ORAL
Qty: 90 TABLET | Refills: 1 | Status: SHIPPED | OUTPATIENT
Start: 2024-09-23

## 2024-09-23 RX ORDER — PHENYTOIN SODIUM 100 MG/1
300 CAPSULE, EXTENDED RELEASE ORAL NIGHTLY
Qty: 270 CAPSULE | Refills: 1 | Status: SHIPPED | OUTPATIENT
Start: 2024-09-23

## 2024-09-23 RX ORDER — OLMESARTAN MEDOXOMIL 40 MG/1
TABLET ORAL
Qty: 90 TABLET | Refills: 1 | Status: SHIPPED | OUTPATIENT
Start: 2024-09-23

## 2024-09-23 RX ORDER — ROSUVASTATIN CALCIUM 40 MG/1
40 TABLET, COATED ORAL DAILY
Qty: 90 TABLET | Refills: 1 | Status: SHIPPED | OUTPATIENT
Start: 2024-09-23

## 2024-09-23 RX ORDER — FUROSEMIDE 20 MG
TABLET ORAL
Qty: 45 TABLET | Refills: 1 | Status: SHIPPED | OUTPATIENT
Start: 2024-09-23

## 2024-09-23 RX ORDER — ASPIRIN 81 MG/1
81 TABLET ORAL DAILY
Start: 2024-09-23

## 2024-10-21 ENCOUNTER — TELEPHONE (OUTPATIENT)
Dept: FAMILY MEDICINE CLINIC | Facility: CLINIC | Age: 79
End: 2024-10-21

## 2024-10-21 NOTE — TELEPHONE ENCOUNTER
? Home health ordered last vist.  Did we have a problem maybe finding someone to go out for home health ?  Ordered 9/23/24

## 2024-10-21 NOTE — TELEPHONE ENCOUNTER
Caller: TOMI OLEA    Relationship: Emergency Contact    Best call back number: [phone]     What is the medical concern/diagnosis: FEET/TOES/LEGS HURTING-POOR CIRCULATION    What specialty or service is being requested: ?      Any additional details: PATIENT WANTING PCP TO SUBMIT A REFERRAL. THEY HAD DISCUSSED THAT AT LAST VISIT

## 2024-11-08 ENCOUNTER — OUTSIDE FACILITY SERVICE (OUTPATIENT)
Dept: FAMILY MEDICINE CLINIC | Facility: CLINIC | Age: 79
End: 2024-11-08
Payer: MEDICARE

## 2024-11-19 DIAGNOSIS — E78.2 HYPERLIPIDEMIA, MIXED: Chronic | ICD-10-CM

## 2024-11-19 DIAGNOSIS — F32.A DEPRESSIVE DISORDER: Chronic | ICD-10-CM

## 2024-11-19 DIAGNOSIS — I69.398 SEIZURE DISORDER AS SEQUELA OF CEREBROVASCULAR ACCIDENT: Chronic | ICD-10-CM

## 2024-11-19 DIAGNOSIS — I10 HYPERTENSION, ESSENTIAL: Chronic | ICD-10-CM

## 2024-11-19 DIAGNOSIS — G40.909 SEIZURE DISORDER AS SEQUELA OF CEREBROVASCULAR ACCIDENT: Chronic | ICD-10-CM

## 2024-11-19 RX ORDER — PHENYTOIN SODIUM 100 MG/1
300 CAPSULE, EXTENDED RELEASE ORAL NIGHTLY
Qty: 270 CAPSULE | Refills: 1 | Status: SHIPPED | OUTPATIENT
Start: 2024-11-19

## 2024-11-19 RX ORDER — ROSUVASTATIN CALCIUM 40 MG/1
TABLET, COATED ORAL
Qty: 90 TABLET | Refills: 1 | Status: SHIPPED | OUTPATIENT
Start: 2024-11-19

## 2024-11-19 RX ORDER — SERTRALINE HYDROCHLORIDE 100 MG/1
TABLET, FILM COATED ORAL
Qty: 90 TABLET | Refills: 1 | Status: SHIPPED | OUTPATIENT
Start: 2024-11-19

## 2024-11-19 RX ORDER — FUROSEMIDE 20 MG/1
TABLET ORAL
Qty: 45 TABLET | Refills: 1 | Status: SHIPPED | OUTPATIENT
Start: 2024-11-19

## 2024-11-19 RX ORDER — OLMESARTAN MEDOXOMIL 40 MG/1
TABLET ORAL
Qty: 90 TABLET | Refills: 1 | Status: SHIPPED | OUTPATIENT
Start: 2024-11-19

## 2025-01-23 ENCOUNTER — TELEPHONE (OUTPATIENT)
Dept: FAMILY MEDICINE CLINIC | Facility: CLINIC | Age: 80
End: 2025-01-23
Payer: MEDICARE

## 2025-01-23 DIAGNOSIS — R73.9 HYPERGLYCEMIA: Primary | ICD-10-CM

## 2025-01-23 NOTE — TELEPHONE ENCOUNTER
Caller: TOMI OLEA    Relationship: Emergency Contact    Best call back number: [phone]    What medication are you requesting: ONE TOUCH TEST STRIPS       If a prescription is needed, what is your preferred pharmacy and phone number:    [facility]   Additional notes:PATIENT WAS JUST DIAGNOSIS WITH DIABETES. PATIENT IS NEEDING A SCRIPT FOR TEST STRIPS FOR HER MACHINE.

## 2025-01-27 ENCOUNTER — TELEPHONE (OUTPATIENT)
Dept: FAMILY MEDICINE CLINIC | Facility: CLINIC | Age: 80
End: 2025-01-27
Payer: MEDICARE

## 2025-01-27 NOTE — TELEPHONE ENCOUNTER
Caller: [facility]    Relationship:     Best call back number: [phone]     What is the best time to reach you: ANY    Who are you requesting to speak with (clinical staff, provider,  specific staff member): NURSE    Do you know the name of the person who called: ALCIDES    What was the call regarding: NEEDS VERBAL ORDERS FOR NURSING TWICE A WEEK FOR 2 WEEKS THEN ONCE A WEEK FOR 3 WEEKS.  PATIENT WAS STARTED ON SODIUM BICARB AND LETROZOLE AND WAS NOT CALLED IN TO THE PHARMACY.  PATIENT NEEDS MEDICATION.      Is it okay if the provider responds through MyChart: PHONE CALL PLEASE

## 2025-01-30 ENCOUNTER — TELEPHONE (OUTPATIENT)
Dept: FAMILY MEDICINE CLINIC | Facility: CLINIC | Age: 80
End: 2025-01-30
Payer: MEDICARE

## 2025-01-30 NOTE — TELEPHONE ENCOUNTER
[facility] CALLED REQUESTING VERBAL ORDERS FOR PT TO HAVE ONCE A WEEK PT FOR 9 WEEKS

## 2025-01-31 ENCOUNTER — OFFICE VISIT (OUTPATIENT)
Dept: FAMILY MEDICINE CLINIC | Facility: CLINIC | Age: 80
End: 2025-01-31
Payer: MEDICARE

## 2025-01-31 VITALS
BODY MASS INDEX: 24.33 KG/M2 | WEIGHT: 155 LBS | OXYGEN SATURATION: 98 % | SYSTOLIC BLOOD PRESSURE: 132 MMHG | HEIGHT: 67 IN | DIASTOLIC BLOOD PRESSURE: 64 MMHG | HEART RATE: 81 BPM

## 2025-01-31 DIAGNOSIS — Z86.73 HISTORY OF STROKE: ICD-10-CM

## 2025-01-31 DIAGNOSIS — N18.6 TYPE 2 DIABETES MELLITUS WITH CHRONIC KIDNEY DISEASE ON CHRONIC DIALYSIS, WITH LONG-TERM CURRENT USE OF INSULIN: ICD-10-CM

## 2025-01-31 DIAGNOSIS — D63.1 ANEMIA IN STAGE 4 CHRONIC KIDNEY DISEASE: Chronic | ICD-10-CM

## 2025-01-31 DIAGNOSIS — Z79.4 TYPE 2 DIABETES MELLITUS WITH CHRONIC KIDNEY DISEASE ON CHRONIC DIALYSIS, WITH LONG-TERM CURRENT USE OF INSULIN: ICD-10-CM

## 2025-01-31 DIAGNOSIS — I69.398 SEIZURE DISORDER AS SEQUELA OF CEREBROVASCULAR ACCIDENT: Chronic | ICD-10-CM

## 2025-01-31 DIAGNOSIS — N18.6 ESRD ON HEMODIALYSIS: ICD-10-CM

## 2025-01-31 DIAGNOSIS — R41.0 CONFUSION: Primary | ICD-10-CM

## 2025-01-31 DIAGNOSIS — Z99.2 TYPE 2 DIABETES MELLITUS WITH CHRONIC KIDNEY DISEASE ON CHRONIC DIALYSIS, WITH LONG-TERM CURRENT USE OF INSULIN: ICD-10-CM

## 2025-01-31 DIAGNOSIS — G40.909 SEIZURE DISORDER AS SEQUELA OF CEREBROVASCULAR ACCIDENT: Chronic | ICD-10-CM

## 2025-01-31 DIAGNOSIS — Z99.2 ESRD ON HEMODIALYSIS: ICD-10-CM

## 2025-01-31 DIAGNOSIS — I69.30 SEQUELA OF CEREBROVASCULAR ACCIDENT: Chronic | ICD-10-CM

## 2025-01-31 DIAGNOSIS — R53.1 WEAKNESS DUE TO OLD STROKE: Chronic | ICD-10-CM

## 2025-01-31 DIAGNOSIS — I69.998 WEAKNESS DUE TO OLD STROKE: Chronic | ICD-10-CM

## 2025-01-31 DIAGNOSIS — M62.838 MUSCLE SPASM: ICD-10-CM

## 2025-01-31 DIAGNOSIS — Z85.3 HISTORY OF LEFT BREAST CANCER: Chronic | ICD-10-CM

## 2025-01-31 DIAGNOSIS — F32.A DEPRESSIVE DISORDER: Chronic | ICD-10-CM

## 2025-01-31 DIAGNOSIS — N18.4 ANEMIA IN STAGE 4 CHRONIC KIDNEY DISEASE: Chronic | ICD-10-CM

## 2025-01-31 DIAGNOSIS — R41.3 MEMORY IMPAIRMENT: Chronic | ICD-10-CM

## 2025-01-31 DIAGNOSIS — I10 HYPERTENSION, ESSENTIAL: Chronic | ICD-10-CM

## 2025-01-31 DIAGNOSIS — E78.2 HYPERLIPIDEMIA, MIXED: Chronic | ICD-10-CM

## 2025-01-31 DIAGNOSIS — E11.22 TYPE 2 DIABETES MELLITUS WITH CHRONIC KIDNEY DISEASE ON CHRONIC DIALYSIS, WITH LONG-TERM CURRENT USE OF INSULIN: ICD-10-CM

## 2025-01-31 DIAGNOSIS — M17.12 PRIMARY OSTEOARTHRITIS OF LEFT KNEE: Chronic | ICD-10-CM

## 2025-01-31 PROBLEM — R73.9 HYPERGLYCEMIA: Status: RESOLVED | Noted: 2024-03-19 | Resolved: 2025-01-31

## 2025-01-31 PROCEDURE — 3078F DIAST BP <80 MM HG: CPT | Performed by: FAMILY MEDICINE

## 2025-01-31 PROCEDURE — 99215 OFFICE O/P EST HI 40 MIN: CPT | Performed by: FAMILY MEDICINE

## 2025-01-31 PROCEDURE — 1160F RVW MEDS BY RX/DR IN RCRD: CPT | Performed by: FAMILY MEDICINE

## 2025-01-31 PROCEDURE — 1126F AMNT PAIN NOTED NONE PRSNT: CPT | Performed by: FAMILY MEDICINE

## 2025-01-31 PROCEDURE — G2211 COMPLEX E/M VISIT ADD ON: HCPCS | Performed by: FAMILY MEDICINE

## 2025-01-31 PROCEDURE — 1159F MED LIST DOCD IN RCRD: CPT | Performed by: FAMILY MEDICINE

## 2025-01-31 PROCEDURE — 3075F SYST BP GE 130 - 139MM HG: CPT | Performed by: FAMILY MEDICINE

## 2025-01-31 RX ORDER — INSULIN LISPRO 100 [IU]/ML
INJECTION, SOLUTION INTRAVENOUS; SUBCUTANEOUS
COMMUNITY
Start: 2025-01-22

## 2025-01-31 RX ORDER — ROSUVASTATIN CALCIUM 20 MG/1
20 TABLET, COATED ORAL DAILY
Qty: 90 TABLET | Refills: 1 | Status: SHIPPED | OUTPATIENT
Start: 2025-01-31

## 2025-01-31 RX ORDER — BLOOD-GLUCOSE METER
1 EACH MISCELLANEOUS
COMMUNITY
Start: 2025-01-24

## 2025-01-31 RX ORDER — SODIUM BICARBONATE 650 MG/1
1300 TABLET ORAL 2 TIMES DAILY
Qty: 360 TABLET | Refills: 1 | Status: SHIPPED | OUTPATIENT
Start: 2025-01-31

## 2025-01-31 RX ORDER — SODIUM BICARBONATE 650 MG/1
650 TABLET ORAL 4 TIMES DAILY
COMMUNITY
End: 2025-01-31 | Stop reason: SDUPTHER

## 2025-01-31 RX ORDER — LEVETIRACETAM 500 MG/1
500 TABLET ORAL DAILY
Qty: 90 TABLET | Refills: 1 | Status: SHIPPED | OUTPATIENT
Start: 2025-01-31

## 2025-01-31 RX ORDER — ASPIRIN 81 MG/1
81 TABLET ORAL DAILY
Start: 2025-01-31

## 2025-01-31 RX ORDER — LEVETIRACETAM 500 MG/1
500 TABLET ORAL DAILY
COMMUNITY
Start: 2025-01-22 | End: 2025-01-31 | Stop reason: SDUPTHER

## 2025-01-31 RX ORDER — SERTRALINE HYDROCHLORIDE 25 MG/1
25 TABLET, FILM COATED ORAL DAILY
Qty: 90 TABLET | Refills: 1 | Status: SHIPPED | OUTPATIENT
Start: 2025-01-31

## 2025-01-31 RX ORDER — BACLOFEN 10 MG/1
5 TABLET ORAL 2 TIMES DAILY PRN
Qty: 30 TABLET | Refills: 3 | Status: SHIPPED | OUTPATIENT
Start: 2025-01-31

## 2025-01-31 RX ORDER — LANCETS
1 EACH MISCELLANEOUS 4 TIMES DAILY
COMMUNITY
Start: 2025-01-24

## 2025-01-31 NOTE — ASSESSMENT & PLAN NOTE
Wheelchair-bound given history of stroke with chronic weakness and severity of knee osteoarthritis.    Continue low-dose aspirin, and Crestor.

## 2025-01-31 NOTE — PROGRESS NOTES
"Chief Complaint  Hospital followup - 1/13/25-1/22/25 at [facility] [place] - Episode of confusion with ER visit and admission with n/v and diarrhea and YESENIA.  Stage IIIB CKD and YESENIA with transition to ESRD requiring dialysis, on dialysis schedule Mon/Wed/Fri currently.  HD cath placed during admission.  New DM with admission with A1c up to 7.3.  Hypoglycemic on lantus so was discharged on sliding scale for therapy.      RLE pain episode worked up with neg eval for DVT.  Having some spasms - would like to try low-dose baclofen    Remains wheelchair bound following CVA in the past with post-CVA sz disorder stable    Subjective    History of Present Illness:  Sirena Chong is a 79 y.o. female who presents today for Hospital followup - 1/13/25-1/22/25 at [facility] [place] - Episode of confusion with ER visit and admission with n/v and diarrhea and YESENIA.  Stage IIIB CKD and YESENIA with transition to ESRD requiring dialysis, on dialysis schedule M/W/F currently.  HD cath placed during admission.  New DM with admission with A1c up to 7.3.  Hypoglycemic on lantus so was discharged on sliding scale for therapy.      RLE pain episode worked up with neg eval for DVT.  Having some spasms - would like to try low-dose baclofen    Remains wheelchair bound following CVA in the past with post-CVA sz disorder stable    Off olmesartan given low BP.  BP was up initially but improved.  No problems with BP at HD clinic today.  Encouraged home bp checks    They stopped her dilantin and changed to keppra daily for seizures.      Need refills on sodium bicarb but understand nephrology will adjust dosing based on labs/dialysis in the future    No problems with R sided dialysis cath    Here with her son.          Objective   Vital Signs:   /64   Pulse 81   Ht 170.2 cm (67\")   Wt 70.3 kg (155 lb)   SpO2 98%   BMI 24.28 kg/m²     Review of Systems   Constitutional:  Negative for appetite change, chills and fever.   HENT:  Negative for hearing loss.    Eyes:  " Negative for blurred vision.   Respiratory:  Negative for chest tightness.    Cardiovascular:  Negative for chest pain.   Gastrointestinal:  Negative for abdominal pain.   Musculoskeletal:  Positive for gait problem.        See HPI   Skin:  Negative for rash.   Neurological:  Positive for weakness.   Psychiatric/Behavioral:  Negative for depressed mood.        Past History:  Medical History: has a past medical history of Alcohol abuse, Anemia, iron deficiency, Aspiration pneumonia, Breast cancer, left (2017), Chronic vertigo, CVA (cerebral vascular accident) (02/2016), Depression, Diabetes, Hypertension, Nephrolithiasis, Seizure (02/2016), Tobacco abuse, and UTI (urinary tract infection).   Surgical History: has a past surgical history that includes Tonsillectomy and Appendectomy.   Family History: family history includes Diabetes in her mother; Hypertension in her mother; Stroke in her mother.   Social History: reports that she quit smoking about 9 years ago. Her smoking use included cigarettes. She started smoking about 64 years ago. She has a 55 pack-year smoking history. She has never used smokeless tobacco. She reports that she does not currently use alcohol. She reports that she does not use drugs.      Current Outpatient Medications:     Accu-Chek Softclix Lancets lancets, 1 each by Other route 4 (Four) Times a Day., Disp: , Rfl:     aspirin 81 MG EC tablet, Take 1 tablet by mouth Daily., Disp: , Rfl:     Blood Glucose Monitoring Suppl (Accu-Chek Guide Me) w/Device kit, Inject 1 each under the skin into the appropriate area as directed 4 (Four) Times a Day After Meals & at Bedtime., Disp: , Rfl:     glucose blood test strip, 1 each by Other route Daily. Dx E11.9  Use to check blood sugar daily for DM, Disp: 100 each, Rfl: 3    HumaLOG KwikPen 100 UNIT/ML solution pen-injector, , Disp: , Rfl:     letrozole (FEMARA) 2.5 MG tablet, Take 1 tablet by mouth Daily., Disp: , Rfl: 0    levETIRAcetam (KEPPRA) 500 MG  tablet, Take 1 tablet by mouth Daily., Disp: 90 tablet, Rfl: 1    multivitamin with minerals tablet tablet, Take 1 tablet by mouth Daily., Disp: , Rfl:     rosuvastatin (CRESTOR) 20 MG tablet, Take 1 tablet by mouth Daily., Disp: 90 tablet, Rfl: 1    sertraline (ZOLOFT) 25 MG tablet, Take 1 tablet by mouth Daily. TAKE 1 TABLET BY MOUTH DAILY FOR MOOD, Disp: 90 tablet, Rfl: 1    sodium bicarbonate 650 MG tablet, Take 2 tablets by mouth 2 (Two) Times a Day., Disp: 360 tablet, Rfl: 1    baclofen (LIORESAL) 10 MG tablet, Take 0.5 tablets by mouth 2 (Two) Times a Day As Needed for Muscle Spasms (leg spasms)., Disp: 30 tablet, Rfl: 3    Allergies: Patient has no known allergies.    Physical Exam  Constitutional:       Appearance: Normal appearance.   HENT:      Head: Normocephalic.      Right Ear: External ear normal.      Left Ear: External ear normal.      Nose: Nose normal.   Eyes:      Pupils: Pupils are equal, round, and reactive to light.   Cardiovascular:      Rate and Rhythm: Normal rate and regular rhythm.      Heart sounds: Normal heart sounds. No murmur heard.     No friction rub. No gallop.   Pulmonary:      Effort: Pulmonary effort is normal.      Breath sounds: Normal breath sounds.   Musculoskeletal:      Cervical back: Normal range of motion.   Skin:     General: Skin is warm and dry.   Neurological:      Mental Status: She is alert and oriented to person, place, and time. Mental status is at baseline.      Sensory: Sensory deficit present.      Motor: Weakness present.      Coordination: Coordination abnormal.      Gait: Gait abnormal.      Comments: Resting in wheelchair, chronic L hemiparesis after past CVA   Psychiatric:         Mood and Affect: Mood normal.         Behavior: Behavior normal.         Thought Content: Thought content normal.          Result Review                   Assessment and Plan  Diagnoses and all orders for this visit:    1. Confusion (Primary)  Assessment & Plan:  Episode of  confusion resolved after correction of her dehydration and started on hemodialysis.  We will continue to monitor.  She is back to baseline and actually feeling better than she has in the past year       2. ESRD on hemodialysis  Assessment & Plan:  Continues hemodialysis on Monday, Wednesday, and Friday.  Nephrology follow-up ongoing.      Orders:  -     sodium bicarbonate 650 MG tablet; Take 2 tablets by mouth 2 (Two) Times a Day.  Dispense: 360 tablet; Refill: 1    3. Type 2 diabetes mellitus with chronic kidney disease on chronic dialysis, with long-term current use of insulin  Assessment & Plan:  We did review her home blood sugar readings and her fasting blood sugars have been in the 120s without any treatment.  They do have insulin from the hospital to use with sliding scale if she gets significantly hyperglycemic but we did discuss holding off on insulin given concerns for hypoglycemia and if her blood sugars become more of a problem then we will add in a daily medication instead of insulin injections due to risk of hypoglycemia.    Patient and her son voiced understanding and are comfortable with plan.    Continue hemodialysis.    Continue aspirin along with reduced dosing of Crestor given now she is on hemodialysis.    Off ARB given low blood pressure with dialysis.    Recheck at follow-up in March    Orders:  -     rosuvastatin (CRESTOR) 20 MG tablet; Take 1 tablet by mouth Daily.  Dispense: 90 tablet; Refill: 1  -     aspirin 81 MG EC tablet; Take 1 tablet by mouth Daily.    4. Anemia in stage 4 chronic kidney disease  Assessment & Plan:  Ongoing monitoring with hemodialysis labs.        5. Hypertension, essential  Assessment & Plan:  Encouraged home blood pressure checks and they will continue to monitor this at dialysis on Monday, Wednesday, and Friday.    Off of Benicar due to problems with hypotension      6. Hyperlipidemia, mixed  Assessment & Plan:   Lipid abnormalities are improving with  treatment    Plan:  Continue same medication/s without change.      Discussed medication dosage, use, side effects, and goals of treatment in detail.    Counseled patient on lifestyle modifications to help control hyperlipidemia.     Patient Treatment Goals:   LDL goal is under 100    Followup at the next regular appointment.    Orders:  -     rosuvastatin (CRESTOR) 20 MG tablet; Take 1 tablet by mouth Daily.  Dispense: 90 tablet; Refill: 1    7. History of stroke  Assessment & Plan:  Continue with wheelchair for fall prevention after hemiparesis following stroke.    Continue on low-dose aspirin and Crestor.  Off Benicar given problems with hypotension    Orders:  -     aspirin 81 MG EC tablet; Take 1 tablet by mouth Daily.    8. Weakness due to old stroke  Assessment & Plan:  Cont use of wheelchair      9. Sequela of cerebrovascular accident  Assessment & Plan:  Wheelchair-bound given history of stroke with chronic weakness and severity of knee osteoarthritis.    Continue low-dose aspirin, and Crestor.      Orders:  -     levETIRAcetam (KEPPRA) 500 MG tablet; Take 1 tablet by mouth Daily.  Dispense: 90 tablet; Refill: 1    10. Memory impairment  Assessment & Plan:  Improved with Zoloft for anxiety.    Her dosing was recently reduced back to 25 mg after starting on dialysis.      11. Seizure disorder as sequela of cerebrovascular accident  Assessment & Plan:  Off Dilantin following need for hemodialysis and on once a day Keppra following discharge and neurology consultation at [facility] was started on hemodialysis January 2025    Orders:  -     levETIRAcetam (KEPPRA) 500 MG tablet; Take 1 tablet by mouth Daily.  Dispense: 90 tablet; Refill: 1    12. History of left breast cancer  Assessment & Plan:  Continue oncology follow-up.  They are managing her letrozole      13. Depressive disorder  Assessment & Plan:  Patient's depression is recurrent and is mild without psychosis. Their depression is  currently in partial remission and the condition is improving with treatment. This will be reassessed at the next regular appointment. F/U as described:patient will continue current medication therapy.    Orders:  -     sertraline (ZOLOFT) 25 MG tablet; Take 1 tablet by mouth Daily. TAKE 1 TABLET BY MOUTH DAILY FOR MOOD  Dispense: 90 tablet; Refill: 1    14. Primary osteoarthritis of left knee  Assessment & Plan:  Stable control with Tylenol arthritis and topical Voltaren gel.      15. Muscle spasm  Assessment & Plan:  Given trial with low-dose baclofen.  Side effects reviewed    Orders:  -     baclofen (LIORESAL) 10 MG tablet; Take 0.5 tablets by mouth 2 (Two) Times a Day As Needed for Muscle Spasms (leg spasms).  Dispense: 30 tablet; Refill: 3        BMI is within normal parameters. No other follow-up for BMI required.      I spent 52 minutes caring for Sirena on this date of service. This time includes time spent by me in the following activities:preparing for the visit, reviewing tests, obtaining and/or reviewing a separately obtained history, performing a medically appropriate examination and/or evaluation , counseling and educating the patient/family/caregiver, ordering medications, tests, or procedures, documenting information in the medical record, independently interpreting results and communicating that information with the patient/family/caregiver, and care coordination    Follow Up  Return in about 7 weeks (around 3/24/2025) for Med recheck.    Patrick Cotto MD

## 2025-01-31 NOTE — ASSESSMENT & PLAN NOTE
Improved with Zoloft for anxiety.    Her dosing was recently reduced back to 25 mg after starting on dialysis.

## 2025-01-31 NOTE — ASSESSMENT & PLAN NOTE
Off Dilantin following need for hemodialysis and on once a day Keppra following discharge and neurology consultation at [facility] was started on hemodialysis January 2025

## 2025-01-31 NOTE — ASSESSMENT & PLAN NOTE
Encouraged home blood pressure checks and they will continue to monitor this at dialysis on Monday, Wednesday, and Friday.    Off of Benicar due to problems with hypotension

## 2025-01-31 NOTE — ASSESSMENT & PLAN NOTE
Continue with wheelchair for fall prevention after hemiparesis following stroke.    Continue on low-dose aspirin and Crestor.  Off Benicar given problems with hypotension

## 2025-01-31 NOTE — ASSESSMENT & PLAN NOTE
We did review her home blood sugar readings and her fasting blood sugars have been in the 120s without any treatment.  They do have insulin from the hospital to use with sliding scale if she gets significantly hyperglycemic but we did discuss holding off on insulin given concerns for hypoglycemia and if her blood sugars become more of a problem then we will add in a daily medication instead of insulin injections due to risk of hypoglycemia.    Patient and her son voiced understanding and are comfortable with plan.    Continue hemodialysis.    Continue aspirin along with reduced dosing of Crestor given now she is on hemodialysis.    Off ARB given low blood pressure with dialysis.    Recheck at follow-up in March

## 2025-01-31 NOTE — ASSESSMENT & PLAN NOTE
Episode of confusion resolved after correction of her dehydration and started on hemodialysis.  We will continue to monitor.  She is back to baseline and actually feeling better than she has in the past year

## 2025-02-07 ENCOUNTER — OUTSIDE FACILITY SERVICE (OUTPATIENT)
Dept: FAMILY MEDICINE CLINIC | Facility: CLINIC | Age: 80
End: 2025-02-07
Payer: MEDICARE

## 2025-02-27 ENCOUNTER — TELEPHONE (OUTPATIENT)
Dept: FAMILY MEDICINE CLINIC | Facility: CLINIC | Age: 80
End: 2025-02-27
Payer: MEDICARE

## 2025-02-27 NOTE — TELEPHONE ENCOUNTER
Renee call and needed a verbal to discontinue nursing at home. Verbal given to discharge from home health.

## 2025-03-04 ENCOUNTER — TELEPHONE (OUTPATIENT)
Dept: FAMILY MEDICINE CLINIC | Facility: CLINIC | Age: 80
End: 2025-03-04
Payer: MEDICARE

## 2025-03-04 NOTE — TELEPHONE ENCOUNTER
Matthew from [facility] physical therapy called and stated patient is complaining of a 10/10 pain level for her left foot he stated he wanted to get dr. Cotto opinion first but normally if they are in that much pain they take them to the er. Spoke with william and she stated to tell them to go to the er. Relayed message to matthew and he verbally understood

## 2025-03-07 ENCOUNTER — TELEPHONE (OUTPATIENT)
Dept: FAMILY MEDICINE CLINIC | Facility: CLINIC | Age: 80
End: 2025-03-07
Payer: MEDICARE

## 2025-03-07 NOTE — TELEPHONE ENCOUNTER
Caller: THOMAS    Relationship: Home Health    Best call back number: [phone]    What orders are you requesting (i.e. lab or imaging): VERBAL ORDERS FOR A NURSE TO SEE THE PATIENT  ONCE WEEKLY FOR ANOTHER THREE WEEKS.    In what timeframe would the patient need to come in: AS SOON AS POSSIBLE    Where will you receive your lab/imaging services:     Additional notes:

## 2025-05-22 DIAGNOSIS — I10 HYPERTENSION, ESSENTIAL: Chronic | ICD-10-CM

## 2025-05-22 DIAGNOSIS — G40.909 SEIZURE DISORDER AS SEQUELA OF CEREBROVASCULAR ACCIDENT: Chronic | ICD-10-CM

## 2025-05-22 DIAGNOSIS — F32.A DEPRESSIVE DISORDER: Chronic | ICD-10-CM

## 2025-05-22 DIAGNOSIS — I69.398 SEIZURE DISORDER AS SEQUELA OF CEREBROVASCULAR ACCIDENT: Chronic | ICD-10-CM

## 2025-05-22 RX ORDER — FUROSEMIDE 20 MG/1
TABLET ORAL
Qty: 45 TABLET | Refills: 1 | OUTPATIENT
Start: 2025-05-22

## 2025-05-22 RX ORDER — SERTRALINE HYDROCHLORIDE 100 MG/1
TABLET, FILM COATED ORAL
Qty: 90 TABLET | Refills: 1 | OUTPATIENT
Start: 2025-05-22

## 2025-05-22 RX ORDER — PHENYTOIN SODIUM 100 MG/1
CAPSULE, EXTENDED RELEASE ORAL
Qty: 270 CAPSULE | Refills: 1 | OUTPATIENT
Start: 2025-05-22

## 2025-05-22 NOTE — TELEPHONE ENCOUNTER
No-showed for last apt in March. Needs to schedule appointment for followup before I can send in refills.

## 2025-08-11 DIAGNOSIS — Z99.2 ESRD ON HEMODIALYSIS: ICD-10-CM

## 2025-08-11 DIAGNOSIS — G40.909 SEIZURE DISORDER AS SEQUELA OF CEREBROVASCULAR ACCIDENT: Chronic | ICD-10-CM

## 2025-08-11 DIAGNOSIS — N18.6 ESRD ON HEMODIALYSIS: ICD-10-CM

## 2025-08-11 DIAGNOSIS — I69.30 SEQUELA OF CEREBROVASCULAR ACCIDENT: Chronic | ICD-10-CM

## 2025-08-11 DIAGNOSIS — I69.398 SEIZURE DISORDER AS SEQUELA OF CEREBROVASCULAR ACCIDENT: Chronic | ICD-10-CM

## 2025-08-11 RX ORDER — LEVETIRACETAM 500 MG/1
500 TABLET ORAL DAILY
Qty: 90 TABLET | Refills: 1 | OUTPATIENT
Start: 2025-08-11

## 2025-08-11 RX ORDER — SODIUM BICARBONATE 650 MG/1
1300 TABLET ORAL 2 TIMES DAILY
Qty: 360 TABLET | Refills: 1 | OUTPATIENT
Start: 2025-08-11

## 2025-08-20 DIAGNOSIS — I69.398 SEIZURE DISORDER AS SEQUELA OF CEREBROVASCULAR ACCIDENT: Chronic | ICD-10-CM

## 2025-08-20 DIAGNOSIS — I69.30 SEQUELA OF CEREBROVASCULAR ACCIDENT: Chronic | ICD-10-CM

## 2025-08-20 DIAGNOSIS — G40.909 SEIZURE DISORDER AS SEQUELA OF CEREBROVASCULAR ACCIDENT: Chronic | ICD-10-CM

## 2025-08-21 RX ORDER — LEVETIRACETAM 500 MG/1
500 TABLET ORAL DAILY
Qty: 90 TABLET | Refills: 1 | Status: SHIPPED | OUTPATIENT
Start: 2025-08-21